# Patient Record
Sex: FEMALE | Race: OTHER | NOT HISPANIC OR LATINO
[De-identification: names, ages, dates, MRNs, and addresses within clinical notes are randomized per-mention and may not be internally consistent; named-entity substitution may affect disease eponyms.]

---

## 2017-01-03 ENCOUNTER — APPOINTMENT (OUTPATIENT)
Dept: NEUROSURGERY | Facility: CLINIC | Age: 72
End: 2017-01-03

## 2017-01-03 VITALS
HEART RATE: 87 BPM | HEIGHT: 56 IN | WEIGHT: 94 LBS | BODY MASS INDEX: 21.15 KG/M2 | DIASTOLIC BLOOD PRESSURE: 78 MMHG | SYSTOLIC BLOOD PRESSURE: 111 MMHG

## 2017-02-09 ENCOUNTER — APPOINTMENT (OUTPATIENT)
Dept: NEUROSURGERY | Facility: CLINIC | Age: 72
End: 2017-02-09

## 2017-03-02 ENCOUNTER — OUTPATIENT (OUTPATIENT)
Dept: OUTPATIENT SERVICES | Facility: HOSPITAL | Age: 72
LOS: 1 days | End: 2017-03-02
Payer: COMMERCIAL

## 2017-03-02 ENCOUNTER — TRANSCRIPTION ENCOUNTER (OUTPATIENT)
Age: 72
End: 2017-03-02

## 2017-03-02 ENCOUNTER — APPOINTMENT (OUTPATIENT)
Dept: NEUROSURGERY | Facility: CLINIC | Age: 72
End: 2017-03-02

## 2017-03-02 DIAGNOSIS — M54.16 RADICULOPATHY, LUMBAR REGION: ICD-10-CM

## 2017-03-02 PROCEDURE — 62323 NJX INTERLAMINAR LMBR/SAC: CPT

## 2017-03-24 ENCOUNTER — APPOINTMENT (OUTPATIENT)
Dept: NEUROSURGERY | Facility: CLINIC | Age: 72
End: 2017-03-24

## 2017-03-24 VITALS
HEART RATE: 76 BPM | SYSTOLIC BLOOD PRESSURE: 110 MMHG | DIASTOLIC BLOOD PRESSURE: 76 MMHG | WEIGHT: 95 LBS | BODY MASS INDEX: 21.37 KG/M2 | HEIGHT: 56 IN

## 2017-03-24 DIAGNOSIS — M48.06 SPINAL STENOSIS, LUMBAR REGION: ICD-10-CM

## 2017-05-04 ENCOUNTER — APPOINTMENT (OUTPATIENT)
Dept: NEUROLOGY | Facility: CLINIC | Age: 72
End: 2017-05-04

## 2017-05-04 VITALS
DIASTOLIC BLOOD PRESSURE: 85 MMHG | HEART RATE: 81 BPM | BODY MASS INDEX: 21.15 KG/M2 | HEIGHT: 56 IN | SYSTOLIC BLOOD PRESSURE: 151 MMHG | WEIGHT: 94 LBS

## 2017-05-04 VITALS — DIASTOLIC BLOOD PRESSURE: 86 MMHG | SYSTOLIC BLOOD PRESSURE: 136 MMHG | HEART RATE: 78 BPM

## 2017-05-04 DIAGNOSIS — Z82.49 FAMILY HISTORY OF ISCHEMIC HEART DISEASE AND OTHER DISEASES OF THE CIRCULATORY SYSTEM: ICD-10-CM

## 2017-05-04 DIAGNOSIS — R53.1 WEAKNESS: ICD-10-CM

## 2017-05-04 DIAGNOSIS — M54.5 LOW BACK PAIN: ICD-10-CM

## 2017-05-04 DIAGNOSIS — Z81.8 FAMILY HISTORY OF OTHER MENTAL AND BEHAVIORAL DISORDERS: ICD-10-CM

## 2017-05-04 DIAGNOSIS — G89.29 LOW BACK PAIN: ICD-10-CM

## 2017-05-04 DIAGNOSIS — Z80.49 FAMILY HISTORY OF MALIGNANT NEOPLASM OF OTHER GENITAL ORGANS: ICD-10-CM

## 2017-05-14 ENCOUNTER — APPOINTMENT (OUTPATIENT)
Dept: MRI IMAGING | Facility: CLINIC | Age: 72
End: 2017-05-14

## 2017-05-14 ENCOUNTER — OUTPATIENT (OUTPATIENT)
Dept: OUTPATIENT SERVICES | Facility: HOSPITAL | Age: 72
LOS: 1 days | End: 2017-05-14
Payer: COMMERCIAL

## 2017-05-14 DIAGNOSIS — M62.81 MUSCLE WEAKNESS (GENERALIZED): ICD-10-CM

## 2017-05-14 DIAGNOSIS — R25.1 TREMOR, UNSPECIFIED: ICD-10-CM

## 2017-05-14 PROCEDURE — 70551 MRI BRAIN STEM W/O DYE: CPT

## 2017-05-24 ENCOUNTER — MESSAGE (OUTPATIENT)
Age: 72
End: 2017-05-24

## 2017-06-01 ENCOUNTER — APPOINTMENT (OUTPATIENT)
Dept: NEUROLOGY | Facility: CLINIC | Age: 72
End: 2017-06-01

## 2017-06-01 VITALS
BODY MASS INDEX: 21.15 KG/M2 | DIASTOLIC BLOOD PRESSURE: 90 MMHG | SYSTOLIC BLOOD PRESSURE: 138 MMHG | HEIGHT: 56 IN | WEIGHT: 94 LBS | HEART RATE: 90 BPM

## 2017-06-01 DIAGNOSIS — M54.16 RADICULOPATHY, LUMBAR REGION: ICD-10-CM

## 2017-06-01 DIAGNOSIS — R42 DIZZINESS AND GIDDINESS: ICD-10-CM

## 2017-06-01 DIAGNOSIS — R25.1 TREMOR, UNSPECIFIED: ICD-10-CM

## 2017-07-24 ENCOUNTER — APPOINTMENT (OUTPATIENT)
Dept: NEUROLOGY | Facility: CLINIC | Age: 72
End: 2017-07-24

## 2017-08-07 ENCOUNTER — RX RENEWAL (OUTPATIENT)
Age: 72
End: 2017-08-07

## 2017-09-28 ENCOUNTER — APPOINTMENT (OUTPATIENT)
Dept: NEUROLOGY | Facility: CLINIC | Age: 72
End: 2017-09-28
Payer: MEDICARE

## 2017-09-28 PROCEDURE — 95911 NRV CNDJ TEST 9-10 STUDIES: CPT

## 2017-09-28 PROCEDURE — 95886 MUSC TEST DONE W/N TEST COMP: CPT

## 2017-11-07 ENCOUNTER — RX RENEWAL (OUTPATIENT)
Age: 72
End: 2017-11-07

## 2018-02-05 ENCOUNTER — RX RENEWAL (OUTPATIENT)
Age: 73
End: 2018-02-05

## 2018-04-04 ENCOUNTER — APPOINTMENT (OUTPATIENT)
Dept: GASTROENTEROLOGY | Facility: CLINIC | Age: 73
End: 2018-04-04

## 2018-07-17 ENCOUNTER — APPOINTMENT (OUTPATIENT)
Dept: NEUROLOGY | Facility: CLINIC | Age: 73
End: 2018-07-17

## 2021-01-24 ENCOUNTER — INPATIENT (INPATIENT)
Facility: HOSPITAL | Age: 76
LOS: 2 days | Discharge: NURSING, REHAB OR LONG-TERM CARE | End: 2021-01-27
Attending: HOSPITALIST | Admitting: HOSPITALIST
Payer: MEDICARE

## 2021-01-24 VITALS
HEART RATE: 72 BPM | OXYGEN SATURATION: 100 % | TEMPERATURE: 98 F | SYSTOLIC BLOOD PRESSURE: 150 MMHG | DIASTOLIC BLOOD PRESSURE: 60 MMHG | RESPIRATION RATE: 18 BRPM

## 2021-01-24 LAB
ALBUMIN SERPL ELPH-MCNC: 3.5 G/DL — SIGNIFICANT CHANGE UP (ref 3.3–5)
ALP SERPL-CCNC: 83 U/L — SIGNIFICANT CHANGE UP (ref 40–120)
ALT FLD-CCNC: 6 U/L — SIGNIFICANT CHANGE UP (ref 4–33)
ANION GAP SERPL CALC-SCNC: 10 MMOL/L — SIGNIFICANT CHANGE UP (ref 7–14)
APPEARANCE UR: CLEAR — SIGNIFICANT CHANGE UP
AST SERPL-CCNC: 26 U/L — SIGNIFICANT CHANGE UP (ref 4–32)
BACTERIA # UR AUTO: NEGATIVE — SIGNIFICANT CHANGE UP
BASOPHILS # BLD AUTO: 0 K/UL — SIGNIFICANT CHANGE UP (ref 0–0.2)
BASOPHILS NFR BLD AUTO: 0 % — SIGNIFICANT CHANGE UP (ref 0–2)
BILIRUB SERPL-MCNC: 0.4 MG/DL — SIGNIFICANT CHANGE UP (ref 0.2–1.2)
BILIRUB UR-MCNC: NEGATIVE — SIGNIFICANT CHANGE UP
BLOOD GAS VENOUS COMPREHENSIVE RESULT: SIGNIFICANT CHANGE UP
BUN SERPL-MCNC: 21 MG/DL — SIGNIFICANT CHANGE UP (ref 7–23)
CALCIUM SERPL-MCNC: 9 MG/DL — SIGNIFICANT CHANGE UP (ref 8.4–10.5)
CHLORIDE SERPL-SCNC: 102 MMOL/L — SIGNIFICANT CHANGE UP (ref 98–107)
CO2 SERPL-SCNC: 26 MMOL/L — SIGNIFICANT CHANGE UP (ref 22–31)
COD CRY URNS QL: ABNORMAL
COLOR SPEC: YELLOW — SIGNIFICANT CHANGE UP
CREAT SERPL-MCNC: 0.54 MG/DL — SIGNIFICANT CHANGE UP (ref 0.5–1.3)
DIFF PNL FLD: NEGATIVE — SIGNIFICANT CHANGE UP
EOSINOPHIL # BLD AUTO: 0.05 K/UL — SIGNIFICANT CHANGE UP (ref 0–0.5)
EOSINOPHIL NFR BLD AUTO: 0.9 % — SIGNIFICANT CHANGE UP (ref 0–6)
EPI CELLS # UR: 1 /HPF — SIGNIFICANT CHANGE UP (ref 0–5)
GLUCOSE SERPL-MCNC: 105 MG/DL — HIGH (ref 70–99)
GLUCOSE UR QL: NEGATIVE — SIGNIFICANT CHANGE UP
HCT VFR BLD CALC: 36 % — SIGNIFICANT CHANGE UP (ref 34.5–45)
HGB BLD-MCNC: 11.2 G/DL — LOW (ref 11.5–15.5)
HYALINE CASTS # UR AUTO: 3 /LPF — SIGNIFICANT CHANGE UP (ref 0–7)
IANC: 4.08 K/UL — SIGNIFICANT CHANGE UP (ref 1.5–8.5)
KETONES UR-MCNC: ABNORMAL
LEUKOCYTE ESTERASE UR-ACNC: NEGATIVE — SIGNIFICANT CHANGE UP
LYMPHOCYTES # BLD AUTO: 0.15 K/UL — LOW (ref 1–3.3)
LYMPHOCYTES # BLD AUTO: 2.8 % — LOW (ref 13–44)
MCHC RBC-ENTMCNC: 27 PG — SIGNIFICANT CHANGE UP (ref 27–34)
MCHC RBC-ENTMCNC: 31.1 GM/DL — LOW (ref 32–36)
MCV RBC AUTO: 86.7 FL — SIGNIFICANT CHANGE UP (ref 80–100)
MONOCYTES # BLD AUTO: 0.3 K/UL — SIGNIFICANT CHANGE UP (ref 0–0.9)
MONOCYTES NFR BLD AUTO: 5.5 % — SIGNIFICANT CHANGE UP (ref 2–14)
NEUTROPHILS # BLD AUTO: 4.16 K/UL — SIGNIFICANT CHANGE UP (ref 1.8–7.4)
NEUTROPHILS NFR BLD AUTO: 75.2 % — SIGNIFICANT CHANGE UP (ref 43–77)
NITRITE UR-MCNC: NEGATIVE — SIGNIFICANT CHANGE UP
PH UR: 6 — SIGNIFICANT CHANGE UP (ref 5–8)
PLATELET # BLD AUTO: 302 K/UL — SIGNIFICANT CHANGE UP (ref 150–400)
POTASSIUM SERPL-MCNC: 3.7 MMOL/L — SIGNIFICANT CHANGE UP (ref 3.5–5.3)
POTASSIUM SERPL-SCNC: 3.7 MMOL/L — SIGNIFICANT CHANGE UP (ref 3.5–5.3)
PROT SERPL-MCNC: 6.4 G/DL — SIGNIFICANT CHANGE UP (ref 6–8.3)
PROT UR-MCNC: ABNORMAL
RBC # BLD: 4.15 M/UL — SIGNIFICANT CHANGE UP (ref 3.8–5.2)
RBC # FLD: 17.7 % — HIGH (ref 10.3–14.5)
RBC CASTS # UR COMP ASSIST: 1 /HPF — SIGNIFICANT CHANGE UP (ref 0–4)
SODIUM SERPL-SCNC: 138 MMOL/L — SIGNIFICANT CHANGE UP (ref 135–145)
SP GR SPEC: 1.03 — HIGH (ref 1.01–1.02)
UROBILINOGEN FLD QL: SIGNIFICANT CHANGE UP
WBC # BLD: 5.4 K/UL — SIGNIFICANT CHANGE UP (ref 3.8–10.5)
WBC # FLD AUTO: 5.4 K/UL — SIGNIFICANT CHANGE UP (ref 3.8–10.5)
WBC UR QL: 1 /HPF — SIGNIFICANT CHANGE UP (ref 0–5)

## 2021-01-24 PROCEDURE — 71046 X-RAY EXAM CHEST 2 VIEWS: CPT | Mod: 26

## 2021-01-24 PROCEDURE — 99285 EMERGENCY DEPT VISIT HI MDM: CPT

## 2021-01-24 RX ORDER — CEFEPIME 1 G/1
1000 INJECTION, POWDER, FOR SOLUTION INTRAMUSCULAR; INTRAVENOUS ONCE
Refills: 0 | Status: COMPLETED | OUTPATIENT
Start: 2021-01-24 | End: 2021-01-24

## 2021-01-24 RX ORDER — VANCOMYCIN HCL 1 G
1000 VIAL (EA) INTRAVENOUS ONCE
Refills: 0 | Status: DISCONTINUED | OUTPATIENT
Start: 2021-01-24 | End: 2021-01-25

## 2021-01-24 NOTE — ED PROVIDER NOTE - CLINICAL SUMMARY MEDICAL DECISION MAKING FREE TEXT BOX
Impression:  Elderly F, with mental status off baseline, and level of energy off baseline.  Physical exam is unremarkable, but unreliable in this age group.  Plan:  labs, UA/UCx, CXR, ECG, CT head, hydrate, reassess.  COVID swab.

## 2021-01-24 NOTE — ED ADULT NURSE NOTE - OBJECTIVE STATEMENT
Li RN: Pt received to room 12 from [facility] due to AMS. AxOx4 at this time, ambulatory with cane at baseline as per pt. When pt is asked why she is here, pt states "I'm here for a blood test. I made an appointment." Pt unable to specify why she is here. Pt states "I live at home with my ." Pt appears thin, comfortable, in NAD, respirations even/unlabored, NSR on monitor. Pt c/o mild back pain. Pt denies headache, dizziness, chest pain, SOB, nausea, vomiting, diarrhea, fever, chills and cough. Pt unable to provide med hx. Stage 1 pressure ulcer noted to sacral area. 18G IV placed to L AC, labs drawn and sent, urine collected, covid swabbed, report endorsed to primary RN Wendy

## 2021-01-24 NOTE — ED PROVIDER NOTE - ATTENDING CONTRIBUTION TO CARE
MD Urbano:  patient seen and evaluated with the resident.  I was present for key portions of the History & Physical, and I agree with the Impression & Plan.  MD Urbano:  74 yo F, BIB EMS from [facility] at behest of family for r/o UTI/urospsis.    Duration: worsening over the last week.    Associated Sx:  unk - patient has dementia.    Better/worse: unk - patient has dementia.    VS: wnl.  Physical Exam: elderly F, chronically ill-appearing, R chest wall PPM/AICD? scar, +sternotomy scar, cachectic.  NCAT, PERRL, EOMI, neck supple, RRR, CTA B,   Abd: s/nd/nt, Ext: no edema, Neuro: AAOx1 (baseline = AAOx1), gait not assessed, strength 5/5 & symmetric throughout.  Impression:  Elderly F, with mental status off baseline, and level of energy off baseline.  Physical exam is unremarkable, but unreliable in this age group.  Plan:  labs, UA/UCx, CXR, ECG, CT head, hydrate, reassess.  COVID swab.

## 2021-01-24 NOTE — ED PROVIDER NOTE - OBJECTIVE STATEMENT
76 y/o F hx of dementia, aortic valve repair on plavix presents from nursing home due to dysuria as per daughter. all information gained from daughter over the phone who states patient was confused today. was stating that she does not like where she is and mentioned burning with urination. daughter wanted pt to be evaluated in ED for uti. pt aao x 2  pt denies any fever, chills, dizziness, cp, palpitations, cough, sob, wheezing, abdominal pain, n/v/d, dysuria, hematuria, or any weight loss.

## 2021-01-24 NOTE — ED ADULT NURSE NOTE - CHIEF COMPLAINT QUOTE
pt from [facility] family requested  pt to come to ed for increased confusion r/o urosepsis. alert,oriented x2. as per nursing home staff " normal mental status". pt unable to verbalize problems urinating. calm,cooperative at triage. fs by ems 140

## 2021-01-24 NOTE — ED PROVIDER NOTE - UNABLE TO OBTAIN
Dementia aao x 2 all information from daughter over phoen aao x 2 aao x 2 all information from daughter over phone

## 2021-01-24 NOTE — ED ADULT TRIAGE NOTE - CHIEF COMPLAINT QUOTE
pt from [facility] family requested  pt to come to ed for increased confusion r/o urosepsis. alert,oriented x2. as per nursing home staff " normal mental status". pt unable to verbalize problems urinating. calm,cooperative at triage. fs by ems 140

## 2021-01-25 ENCOUNTER — TRANSCRIPTION ENCOUNTER (OUTPATIENT)
Age: 76
End: 2021-01-25

## 2021-01-25 DIAGNOSIS — J18.9 PNEUMONIA, UNSPECIFIED ORGANISM: ICD-10-CM

## 2021-01-25 DIAGNOSIS — G20 PARKINSON'S DISEASE: ICD-10-CM

## 2021-01-25 DIAGNOSIS — G93.41 METABOLIC ENCEPHALOPATHY: ICD-10-CM

## 2021-01-25 LAB
CULTURE RESULTS: SIGNIFICANT CHANGE UP
SARS-COV-2 RNA SPEC QL NAA+PROBE: SIGNIFICANT CHANGE UP
SPECIMEN SOURCE: SIGNIFICANT CHANGE UP

## 2021-01-25 PROCEDURE — 12345: CPT | Mod: NC

## 2021-01-25 PROCEDURE — 99223 1ST HOSP IP/OBS HIGH 75: CPT

## 2021-01-25 PROCEDURE — 70450 CT HEAD/BRAIN W/O DYE: CPT | Mod: 26

## 2021-01-25 PROCEDURE — 71250 CT THORAX DX C-: CPT | Mod: 26

## 2021-01-25 RX ORDER — ENOXAPARIN SODIUM 100 MG/ML
30 INJECTION SUBCUTANEOUS DAILY
Refills: 0 | Status: DISCONTINUED | OUTPATIENT
Start: 2021-01-25 | End: 2021-01-27

## 2021-01-25 RX ORDER — VANCOMYCIN HCL 1 G
750 VIAL (EA) INTRAVENOUS EVERY 12 HOURS
Refills: 0 | Status: DISCONTINUED | OUTPATIENT
Start: 2021-01-25 | End: 2021-01-25

## 2021-01-25 RX ORDER — DRONABINOL 2.5 MG
2.5 CAPSULE ORAL
Refills: 0 | Status: DISCONTINUED | OUTPATIENT
Start: 2021-01-25 | End: 2021-01-27

## 2021-01-25 RX ORDER — ZINC OXIDE 200 MG/G
1 OINTMENT TOPICAL
Refills: 0 | Status: DISCONTINUED | OUTPATIENT
Start: 2021-01-25 | End: 2021-01-27

## 2021-01-25 RX ORDER — CEFEPIME 1 G/1
1000 INJECTION, POWDER, FOR SOLUTION INTRAMUSCULAR; INTRAVENOUS EVERY 8 HOURS
Refills: 0 | Status: DISCONTINUED | OUTPATIENT
Start: 2021-01-25 | End: 2021-01-26

## 2021-01-25 RX ORDER — VANCOMYCIN HCL 1 G
750 VIAL (EA) INTRAVENOUS EVERY 24 HOURS
Refills: 0 | Status: DISCONTINUED | OUTPATIENT
Start: 2021-01-25 | End: 2021-01-26

## 2021-01-25 RX ORDER — CARBIDOPA AND LEVODOPA 25; 100 MG/1; MG/1
1 TABLET ORAL
Refills: 0 | Status: DISCONTINUED | OUTPATIENT
Start: 2021-01-25 | End: 2021-01-27

## 2021-01-25 RX ORDER — ASPIRIN/CALCIUM CARB/MAGNESIUM 324 MG
81 TABLET ORAL DAILY
Refills: 0 | Status: DISCONTINUED | OUTPATIENT
Start: 2021-01-25 | End: 2021-01-27

## 2021-01-25 RX ORDER — CLOPIDOGREL BISULFATE 75 MG/1
75 TABLET, FILM COATED ORAL DAILY
Refills: 0 | Status: DISCONTINUED | OUTPATIENT
Start: 2021-01-25 | End: 2021-01-27

## 2021-01-25 RX ORDER — PANTOPRAZOLE SODIUM 20 MG/1
40 TABLET, DELAYED RELEASE ORAL
Refills: 0 | Status: DISCONTINUED | OUTPATIENT
Start: 2021-01-25 | End: 2021-01-27

## 2021-01-25 RX ORDER — SENNA PLUS 8.6 MG/1
2 TABLET ORAL AT BEDTIME
Refills: 0 | Status: DISCONTINUED | OUTPATIENT
Start: 2021-01-25 | End: 2021-01-27

## 2021-01-25 RX ORDER — ZINC SULFATE TAB 220 MG (50 MG ZINC EQUIVALENT) 220 (50 ZN) MG
220 TAB ORAL DAILY
Refills: 0 | Status: DISCONTINUED | OUTPATIENT
Start: 2021-01-25 | End: 2021-01-27

## 2021-01-25 RX ORDER — ASCORBIC ACID 60 MG
500 TABLET,CHEWABLE ORAL DAILY
Refills: 0 | Status: DISCONTINUED | OUTPATIENT
Start: 2021-01-25 | End: 2021-01-27

## 2021-01-25 RX ADMIN — Medication 1 TABLET(S): at 11:36

## 2021-01-25 RX ADMIN — SENNA PLUS 2 TABLET(S): 8.6 TABLET ORAL at 20:29

## 2021-01-25 RX ADMIN — CEFEPIME 100 MILLIGRAM(S): 1 INJECTION, POWDER, FOR SOLUTION INTRAMUSCULAR; INTRAVENOUS at 16:48

## 2021-01-25 RX ADMIN — Medication 500 MILLIGRAM(S): at 11:36

## 2021-01-25 RX ADMIN — CARBIDOPA AND LEVODOPA 1 TABLET(S): 25; 100 TABLET ORAL at 13:11

## 2021-01-25 RX ADMIN — CLOPIDOGREL BISULFATE 75 MILLIGRAM(S): 75 TABLET, FILM COATED ORAL at 11:36

## 2021-01-25 RX ADMIN — CARBIDOPA AND LEVODOPA 1 TABLET(S): 25; 100 TABLET ORAL at 16:48

## 2021-01-25 RX ADMIN — CEFEPIME 100 MILLIGRAM(S): 1 INJECTION, POWDER, FOR SOLUTION INTRAMUSCULAR; INTRAVENOUS at 00:13

## 2021-01-25 RX ADMIN — Medication 2.5 MILLIGRAM(S): at 16:48

## 2021-01-25 RX ADMIN — ZINC SULFATE TAB 220 MG (50 MG ZINC EQUIVALENT) 220 MILLIGRAM(S): 220 (50 ZN) TAB at 13:11

## 2021-01-25 RX ADMIN — Medication 2.5 MILLIGRAM(S): at 05:55

## 2021-01-25 RX ADMIN — Medication 250 MILLIGRAM(S): at 05:55

## 2021-01-25 RX ADMIN — ENOXAPARIN SODIUM 30 MILLIGRAM(S): 100 INJECTION SUBCUTANEOUS at 13:11

## 2021-01-25 RX ADMIN — CEFEPIME 100 MILLIGRAM(S): 1 INJECTION, POWDER, FOR SOLUTION INTRAMUSCULAR; INTRAVENOUS at 23:45

## 2021-01-25 RX ADMIN — CARBIDOPA AND LEVODOPA 1 TABLET(S): 25; 100 TABLET ORAL at 09:00

## 2021-01-25 RX ADMIN — CEFEPIME 100 MILLIGRAM(S): 1 INJECTION, POWDER, FOR SOLUTION INTRAMUSCULAR; INTRAVENOUS at 09:00

## 2021-01-25 RX ADMIN — PANTOPRAZOLE SODIUM 40 MILLIGRAM(S): 20 TABLET, DELAYED RELEASE ORAL at 05:55

## 2021-01-25 RX ADMIN — Medication 81 MILLIGRAM(S): at 11:36

## 2021-01-25 RX ADMIN — ZINC OXIDE 1 APPLICATION(S): 200 OINTMENT TOPICAL at 16:48

## 2021-01-25 NOTE — DISCHARGE NOTE PROVIDER - HOSPITAL COURSE
75 F PMH Parkinson's Disease w/ dementia (AOX2), AVR on Plavix, CHF, PVD, Afib not on A/C p/w metabolic encephalopathy, HCAP  CXR Right midlung peripheral opacity may represent covid 19 pneumonia. Covid swab was negative. Pt has been treated w/ vanc/cefepime. CT chest showed: Patchy opacities in the right upper lobe demonstrate increased attenuation. Diffuse increased attenuation of the liver is also noted  -CT head-Mild chronic white matter microvascular type changes.  Parkinson disease, c/w sinemet       75 F PMH Parkinson's Disease, dementia (AOX2), AVR on Plavix, CHF, PVD, Afib (not on A/C) p/w metabolic encephalopathy, HCAP. CT head with mild chronic white matter microvascular type changes. CXR RML peripheral opacity may represent COVID19 pneumonia. COVID swab was negative. Pt has been treated with Vanco/Cefepime. CT chest showed patchy opacities in the right upper lobe demonstrate increased attenuation. Diffuse increased attenuation of the liver is also noted. ID consulted, recommend no Abx.     Pt monitored and remained hemodynamically stable.    Spoke with attending, Dr. Zamora, pt is medically stable for discharge to rehab.

## 2021-01-25 NOTE — H&P ADULT - NSHPLABSRESULTS_GEN_ALL_CORE
138  |  102  |  21  ----------------------------<  105<H>  3.7   |  26  |  0.54    Ca    9.0      2021 21:41    TPro  6.4  /  Alb  3.5  /  TBili  0.4  /  DBili  x   /  AST  26  /  ALT  6   /  AlkPhos  83                              11.2   5.40  )-----------( 302      ( 2021 21:40 )             36.0             LIVER FUNCTIONS - ( 2021 21:41 )  Alb: 3.5 g/dL / Pro: 6.4 g/dL / ALK PHOS: 83 U/L / ALT: 6 U/L / AST: 26 U/L / GGT: x                 Urinalysis Basic - ( 2021 21:40 )    Color: Yellow / Appearance: Clear / S.033 / pH: x  Gluc: x / Ketone: Trace  / Bili: Negative / Urobili: <2 mg/dL   Blood: x / Protein: 30 mg/dL / Nitrite: Negative   Leuk Esterase: Negative / RBC: 1 /HPF / WBC 1 /HPF   Sq Epi: x / Non Sq Epi: 1 /HPF / Bacteria: Negative

## 2021-01-25 NOTE — PROGRESS NOTE ADULT - PROBLEM SELECTOR PLAN 1
seen on CXR, will check CT chest urgent, c/w vanc/cefepime for now, f/u COVID testing (negative on 1/22 in NH) seen on CXR, CT chest noted :Patchy opacities in the right upper lobe  as above , c/w vanc/cefepime for now,    COVID testing  negative again on 1/24 (negative on 1/22 in NH)  f/u blood and urine cx

## 2021-01-25 NOTE — DISCHARGE NOTE PROVIDER - NSDCMRMEDTOKEN_GEN_ALL_CORE_FT
acetaminophen 650 mg oral tablet: 1 tab(s) orally every 4 hours, As Needed  aspirin 81 mg oral tablet, chewable: 1 tab(s) orally once a day  carbidopa-levodopa 25 mg-100 mg oral tablet: 1 tab(s) orally 3 times a day. 9 AM, 1 PM, 5 PM  dronabinol 2.5 mg oral capsule: 1 cap(s) orally 2 times a day  MiraLax oral powder for reconstitution: 17 gram(s) orally once a day  Multiple Vitamins oral tablet: 1 tab(s) orally once a day  Plavix 75 mg oral tablet: 1 tab(s) orally once a day  Protonix 40 mg oral delayed release tablet: 1 tab(s) orally once a day  Senna 8.6 mg oral tablet: 1 tab(s) orally once a day (at bedtime)  Vitamin C 500 mg oral tablet: 1 tab(s) orally once a day  zinc sulfate 220 mg oral tablet: 1 tab(s) orally once a day   acetaminophen 650 mg oral tablet: 1 tab(s) orally every 4 hours, As Needed  aspirin 81 mg oral tablet, chewable: 1 tab(s) orally once a day  carbidopa-levodopa 25 mg-100 mg oral tablet: 1 tab(s) orally 3 times a day. 9 AM, 1 PM, 5 PM  dronabinol 2.5 mg oral capsule: 1 cap(s) orally 2 times a day  MiraLax oral powder for reconstitution: 17 gram(s) orally once a day  Multiple Vitamins oral tablet: 1 tab(s) orally once a day  Plavix 75 mg oral tablet: 1 tab(s) orally once a day  Protonix 40 mg oral delayed release tablet: 1 tab(s) orally once a day  Senna 8.6 mg oral tablet: 1 tab(s) orally once a day (at bedtime)  Vitamin C 500 mg oral tablet: 1 tab(s) orally once a day  zinc oxide 20% topical ointment: 1 application topically 2 times a day  zinc sulfate 220 mg oral tablet: 1 tab(s) orally once a day   acetaminophen 650 mg oral tablet: 1 tab(s) orally every 4 hours, As Needed  aspirin 81 mg oral tablet, chewable: 1 tab(s) orally once a day  carbidopa-levodopa 25 mg-100 mg oral tablet: 1 tab(s) orally 3 times a day. 9 AM, 1 PM, 5 PM  dronabinol 2.5 mg oral capsule: 1 cap(s) orally 2 times a day  Multiple Vitamins oral tablet: 1 tab(s) orally once a day  Plavix 75 mg oral tablet: 1 tab(s) orally once a day  Protonix 40 mg oral delayed release tablet: 1 tab(s) orally once a day  Senna 8.6 mg oral tablet: 1 tab(s) orally once a day (at bedtime), As Needed Constipation  Vitamin C 500 mg oral tablet: 1 tab(s) orally once a day  zinc oxide 20% topical ointment: 1 application topically 2 times a day  zinc sulfate 220 mg oral tablet: 1 tab(s) orally once a day

## 2021-01-25 NOTE — DIETITIAN INITIAL EVALUATION ADULT. - ADD RECOMMEND
1). Encourage and monitor oral intake, especially of nutrition supplement. 2). Follow pt as per protocol. 3). Monitor weights, labs, BM's, skin integrity, p.o. intake and edema.

## 2021-01-25 NOTE — H&P ADULT - ASSESSMENT
75 F PMH Parkinson's Disease w/ dementia (AOX2), AVR on Plavix, CHF, PVD, Afib not on A/C p/w metabolic encephalopathy, HCAP

## 2021-01-25 NOTE — ED ADULT NURSE REASSESSMENT NOTE - NS ED NURSE REASSESS COMMENT FT1
Break shift RN: py brought to floor at this time. Resps are even and unlabored. Spo2 100% On Ra. Pt in NAD.

## 2021-01-25 NOTE — DIETITIAN INITIAL EVALUATION ADULT. - PERTINENT MEDS FT
MEDICATIONS  (STANDING):  ascorbic acid 500 milliGRAM(s) Oral daily  aspirin enteric coated 81 milliGRAM(s) Oral daily  carbidopa/levodopa  25/100 1 Tablet(s) Oral <User Schedule>  cefepime   IVPB 1000 milliGRAM(s) IV Intermittent every 8 hours  clopidogrel Tablet 75 milliGRAM(s) Oral daily  dronabinol 2.5 milliGRAM(s) Oral two times a day  enoxaparin Injectable 30 milliGRAM(s) SubCutaneous daily  multivitamin 1 Tablet(s) Oral daily  pantoprazole    Tablet 40 milliGRAM(s) Oral before breakfast  senna 2 Tablet(s) Oral at bedtime  vancomycin  IVPB 750 milliGRAM(s) IV Intermittent every 24 hours  zinc sulfate 220 milliGRAM(s) Oral daily

## 2021-01-25 NOTE — H&P ADULT - NSHPPHYSICALEXAM_GEN_ALL_CORE
T(C): 36.9 (01-25-21 @ 01:16), Max: 36.9 (01-25-21 @ 01:16)  HR: 67 (01-25-21 @ 01:16) (67 - 75)  BP: 146/60 (01-25-21 @ 01:16) (146/60 - 160/49)  RR: 18 (01-25-21 @ 01:16) (18 - 18)  SpO2: 100% (01-25-21 @ 01:16) (100% - 100%)    Constitutional: NAD, thin  Ears, Nose, Mouth, and Throat: normal external ears and nose, normal hearing, moist oral mucosa  Eyes: normal conjunctiva, EOMI, PERRL  Neck: supple, no JVD  Respiratory: Clear to auscultation bilaterally. No wheezes, rales or rhonchi. Normal respiratory effort  Cardiovascular: RRR, +SM, no edema, 2+ Peripheral Pulses  Gastrointestinal: soft, nontender, nondistended, +BS, no hernia  Skin: warm, dry, no rash  Neurologic: sensation grossly intact, CN grossly intact, non-focal exam  Musculoskeletal: no clubbing, no cyanosis, no joint swelling  Psychiatric: AOX3, appropriate mood, affect

## 2021-01-25 NOTE — PROGRESS NOTE ADULT - SUBJECTIVE AND OBJECTIVE BOX
Patient is a 75y old  Female who presents with a chief complaint of AMS (2021 00:56)      SUBJECTIVE / OVERNIGHT EVENTS:    MEDICATIONS  (STANDING):  ascorbic acid 500 milliGRAM(s) Oral daily  aspirin enteric coated 81 milliGRAM(s) Oral daily  carbidopa/levodopa  25/100 1 Tablet(s) Oral <User Schedule>  cefepime   IVPB 1000 milliGRAM(s) IV Intermittent every 8 hours  clopidogrel Tablet 75 milliGRAM(s) Oral daily  dronabinol 2.5 milliGRAM(s) Oral two times a day  enoxaparin Injectable 30 milliGRAM(s) SubCutaneous daily  multivitamin 1 Tablet(s) Oral daily  pantoprazole    Tablet 40 milliGRAM(s) Oral before breakfast  senna 2 Tablet(s) Oral at bedtime  vancomycin  IVPB 750 milliGRAM(s) IV Intermittent every 24 hours  zinc sulfate 220 milliGRAM(s) Oral daily    MEDICATIONS  (PRN):      Vital Signs Last 24 Hrs  T(C): 37.1 (2021 03:59), Max: 37.1 (2021 03:59)  T(F): 98.7 (2021 03:59), Max: 98.7 (2021 03:59)  HR: 63 (2021 03:59) (63 - 75)  BP: 140/59 (2021 03:59) (140/59 - 160/49)  BP(mean): --  RR: 17 (2021 03:59) (17 - 18)  SpO2: 100% (2021 03:59) (100% - 100%)  CAPILLARY BLOOD GLUCOSE        I&O's Summary      PHYSICAL EXAM:  GENERAL: NAD, well-developed  HEAD:  Atraumatic, Normocephalic  EYES: EOMI, PERRLA, conjunctiva and sclera clear  NECK: Supple, No JVD  CHEST/LUNG: Clear to auscultation bilaterally; No wheeze  HEART: Regular rate and rhythm; No murmurs, rubs, or gallops  ABDOMEN: Soft, Nontender, Nondistended; Bowel sounds present  EXTREMITIES:  2+ Peripheral Pulses, No clubbing, cyanosis, or edema  PSYCH: AAOx3  NEUROLOGY: non-focal  SKIN: No rashes or lesions    LABS:                        11.2   5.40  )-----------( 302      ( 2021 21:40 )             36.0         138  |  102  |  21  ----------------------------<  105<H>  3.7   |  26  |  0.54    Ca    9.0      2021 21:41    TPro  6.4  /  Alb  3.5  /  TBili  0.4  /  DBili  x   /  AST  26  /  ALT  6   /  AlkPhos  83            Urinalysis Basic - ( 2021 21:40 )    Color: Yellow / Appearance: Clear / S.033 / pH: x  Gluc: x / Ketone: Trace  / Bili: Negative / Urobili: <2 mg/dL   Blood: x / Protein: 30 mg/dL / Nitrite: Negative   Leuk Esterase: Negative / RBC: 1 /HPF / WBC 1 /HPF   Sq Epi: x / Non Sq Epi: 1 /HPF / Bacteria: Negative        RADIOLOGY & ADDITIONAL TESTS:    Imaging Personally Reviewed:    Consultant(s) Notes Reviewed:      Care Discussed with Consultants/Other Providers:   Patient is a 75y old  Female who presents with a chief complaint of AMS (2021 00:56)      SUBJECTIVE / OVERNIGHT EVENTS: patient seen and examined by bedside, denies headache, dizziness, SOB, CP, Palpitations , N/V/D, abdominal pain  pt reports frequent urination asking for diaper       MEDICATIONS  (STANDING):  ascorbic acid 500 milliGRAM(s) Oral daily  aspirin enteric coated 81 milliGRAM(s) Oral daily  carbidopa/levodopa  25/100 1 Tablet(s) Oral <User Schedule>  cefepime   IVPB 1000 milliGRAM(s) IV Intermittent every 8 hours  clopidogrel Tablet 75 milliGRAM(s) Oral daily  dronabinol 2.5 milliGRAM(s) Oral two times a day  enoxaparin Injectable 30 milliGRAM(s) SubCutaneous daily  multivitamin 1 Tablet(s) Oral daily  pantoprazole    Tablet 40 milliGRAM(s) Oral before breakfast  senna 2 Tablet(s) Oral at bedtime  vancomycin  IVPB 750 milliGRAM(s) IV Intermittent every 24 hours  zinc sulfate 220 milliGRAM(s) Oral daily    MEDICATIONS  (PRN):      Vital Signs Last 24 Hrs  T(C): 37.1 (2021 03:59), Max: 37.1 (2021 03:59)  T(F): 98.7 (2021 03:59), Max: 98.7 (2021 03:59)  HR: 63 (2021 03:59) (63 - 75)  BP: 140/59 (2021 03:59) (140/59 - 160/49)  BP(mean): --  RR: 17 (2021 03:59) (17 - 18)  SpO2: 100% (2021 03:59) (100% - 100%)  CAPILLARY BLOOD GLUCOSE        I&O's Summary      PHYSICAL EXAM:  GENERAL: NAD, well-developed  HEAD:  Atraumatic, Normocephalic  EYES: EOMI, PERRLA, conjunctiva and sclera clear  CHEST/LUNG: Clear to auscultation bilaterally; No wheeze  HEART: Regular rate and rhythm; , systolic Murmur +   ABDOMEN: Soft, Nontender, Nondistended; Bowel sounds present  EXTREMITIES:  2+ Peripheral Pulses, No clubbing, cyanosis, or edema  PSYCH: AAOx2  NEUROLOGY: non-focal      LABS:                        11.2   5.40  )-----------( 302      ( 2021 21:40 )             36.0         138  |  102  |  21  ----------------------------<  105<H>  3.7   |  26  |  0.54    Ca    9.0      2021 21:41    TPro  6.4  /  Alb  3.5  /  TBili  0.4  /  DBili  x   /  AST  26  /  ALT  6   /  AlkPhos  83            Urinalysis Basic - ( 2021 21:40 )    Color: Yellow / Appearance: Clear / S.033 / pH: x  Gluc: x / Ketone: Trace  / Bili: Negative / Urobili: <2 mg/dL   Blood: x / Protein: 30 mg/dL / Nitrite: Negative   Leuk Esterase: Negative / RBC: 1 /HPF / WBC 1 /HPF   Sq Epi: x / Non Sq Epi: 1 /HPF / Bacteria: Negative        RADIOLOGY & ADDITIONAL TESTS:  < from: CT Chest No Cont (21 @ 02:38) >    No hilar and/or mediastinal adenopathy is noted.    Heart is enlarged in size. Patient is status post repair of the ascending aorta. Displaced calcification is noted within the lumen of the aorta at the level of the aortic arch and the descending thoracic aorta suggestive of aortic dissection. Calcification of the coronary arteries is noted. No pericardial effusion is noted.    No endobronchial lesions are noted. Mild bronchiectasis is noted within the right middle lobe and lingula. Some of the dilated bronchi are filled with mucus. Patchy high attenuation opacities are noted in the right upper lobe. Few small opacities are also noted within the left upper and both lower lobes. No pleural effusions are noted.    Below the diaphragm, visualized portions of the abdomen demonstrate diffuse increased attenuation of the liver.    Degenerative changes of the spine are noted.    Impression: Patchy opacities in the right upper lobe demonstrate increased attenuation. Diffuse increased attenuation of the liver is also noted. The constellation of the above are likely secondary to amiodarone ingestion.    < end of copied text >    Imaging Personally Reviewed:    Consultant(s) Notes Reviewed:      Care Discussed with Consultants/Other Providers:

## 2021-01-25 NOTE — H&P ADULT - HISTORY OF PRESENT ILLNESS
Parkinson's Disease  Afib not on A/C  CHF  PVD  Mechanical soft diet with thin liquids    COVID negative on 1/22/21 *****Patient unable to provide a subjective history due to underlying dementia, encephalopathy, history obtained from medical records, ER*****  Patient is a 76 y/o F PMH Parkinson's Disease w/ dementia (AOX2), AVR on Plavix, CHF, PVD, Afib not on A/C sent in from [facility] for increased confusion. ED d/w daughter, reports that patient was confused yesterday, complaining of dysuria. Patient herself has no complaints.  COVID negative on 1/22/21

## 2021-01-25 NOTE — DISCHARGE NOTE PROVIDER - INSTRUCTIONS
Low salt, soft diet, assist with meals   Ensure Pudding x 3/day   Low salt, soft diet, assist with meals   Ensure Pudding 3x/day

## 2021-01-25 NOTE — PHYSICAL THERAPY INITIAL EVALUATION ADULT - DISCHARGE DISPOSITION, PT EVAL
Home with skilled home PT for gait training, transfer training, strengthening, and to increase independence within the home rehabilitation facility

## 2021-01-25 NOTE — PHYSICAL THERAPY INITIAL EVALUATION ADULT - PERTINENT HX OF CURRENT PROBLEM, REHAB EVAL
Patient is a 75 year old female admitted for metabolic encephelopathy and found to have PNA. PMH listed below

## 2021-01-25 NOTE — DIETITIAN NUTRITION RISK NOTIFICATION - TREATMENT: THE FOLLOWING DIET HAS BEEN RECOMMENDED
Diet, Dysphagia 2 Mechanical Soft-Thin Liquids:   Supplement Feeding Modality:  Oral  Ensure Pudding Cans or Servings Per Day:  1       Frequency:  Three Times a day (01-25-21 @ 12:57) [Pending Verification By Attending]  Diet, Dysphagia 2 Mechanical Soft-Thin Liquids (01-25-21 @ 01:25) [Active]

## 2021-01-25 NOTE — PROGRESS NOTE ADULT - ATTENDING COMMENTS
PT luis mal noted, recommend home PT PT shekhar noted, recommend home PT  Message left for daughter on the phone

## 2021-01-25 NOTE — DIETITIAN INITIAL EVALUATION ADULT. - OTHER INFO
74 y/o female from NH with Parkinson's dementia admitted with dx of PNA. Visited with pt to obtain nutrition hx, however pt was preoccupied with using the bathroom. She stated that she wasn't eating very well though. Attempted to contact pt's daughter, Maddy, however call went to voicemail. RN said that pt has not eaten well today. Nutrition consult ordered for advanced stage pressure injury; pt noted with Stage 1 sacral and suspected DTI of coccyx. Pt additionally exhibits severe subcutaneous fat store loss and muscle mass wasting which place her at severe risk for malnutrition. Recommend Ensure Pudding x 3/day (510 kcal, 12 gm protein). No GI distress. Encourage and monitor oral intake, especially of nutrition supplement. RDN services to remain available as needed.

## 2021-01-25 NOTE — H&P ADULT - PROBLEM SELECTOR PLAN 1
seen on CXR, will check CT chest urgent, c/w vanc/cefepime for now, f/u COVID testing (negative on 1/22 in NH)

## 2021-01-25 NOTE — DISCHARGE NOTE PROVIDER - DETAILS OF MALNUTRITION DIAGNOSIS/DIAGNOSES
This patient has been assessed with a concern for Malnutrition and was treated during this hospitalization for the following Nutrition diagnosis/diagnoses:     -  01/25/2021: Severe protein-calorie malnutrition   -  01/25/2021: Underweight (BMI < 19)   This patient has been assessed with a concern for Malnutrition and was treated during this hospitalization for the following Nutrition diagnosis/diagnoses:     -  01/25/2021: Severe protein-calorie malnutrition   -  01/25/2021: Underweight (BMI < 19)    This patient has been assessed with a concern for Malnutrition and was treated during this hospitalization for the following Nutrition diagnosis/diagnoses:     -  01/25/2021: Severe protein-calorie malnutrition   -  01/25/2021: Underweight (BMI < 19)   This patient has been assessed with a concern for Malnutrition and was treated during this hospitalization for the following Nutrition diagnosis/diagnoses:     -  01/25/2021: Severe protein-calorie malnutrition   -  01/25/2021: Underweight (BMI < 19)    This patient has been assessed with a concern for Malnutrition and was treated during this hospitalization for the following Nutrition diagnosis/diagnoses:     -  01/25/2021: Severe protein-calorie malnutrition   -  01/25/2021: Underweight (BMI < 19)    This patient has been assessed with a concern for Malnutrition and was treated during this hospitalization for the following Nutrition diagnosis/diagnoses:     -  01/25/2021: Severe protein-calorie malnutrition   -  01/25/2021: Underweight (BMI < 19)   This patient has been assessed with a concern for Malnutrition and was treated during this hospitalization for the following Nutrition diagnosis/diagnoses:     -  01/25/2021: Severe protein-calorie malnutrition   -  01/25/2021: Underweight (BMI < 19)    This patient has been assessed with a concern for Malnutrition and was treated during this hospitalization for the following Nutrition diagnosis/diagnoses:     -  01/25/2021: Severe protein-calorie malnutrition   -  01/25/2021: Underweight (BMI < 19)    This patient has been assessed with a concern for Malnutrition and was treated during this hospitalization for the following Nutrition diagnosis/diagnoses:     -  01/25/2021: Severe protein-calorie malnutrition   -  01/25/2021: Underweight (BMI < 19)    This patient has been assessed with a concern for Malnutrition and was treated during this hospitalization for the following Nutrition diagnosis/diagnoses:     -  01/25/2021: Severe protein-calorie malnutrition   -  01/25/2021: Underweight (BMI < 19)

## 2021-01-26 LAB
ALBUMIN SERPL ELPH-MCNC: 3.1 G/DL — LOW (ref 3.3–5)
ALP SERPL-CCNC: 77 U/L — SIGNIFICANT CHANGE UP (ref 40–120)
ALT FLD-CCNC: <5 U/L — SIGNIFICANT CHANGE UP (ref 4–33)
ANION GAP SERPL CALC-SCNC: 8 MMOL/L — SIGNIFICANT CHANGE UP (ref 7–14)
AST SERPL-CCNC: 20 U/L — SIGNIFICANT CHANGE UP (ref 4–32)
BILIRUB SERPL-MCNC: 0.3 MG/DL — SIGNIFICANT CHANGE UP (ref 0.2–1.2)
BUN SERPL-MCNC: 8 MG/DL — SIGNIFICANT CHANGE UP (ref 7–23)
CALCIUM SERPL-MCNC: 8.1 MG/DL — LOW (ref 8.4–10.5)
CHLORIDE SERPL-SCNC: 103 MMOL/L — SIGNIFICANT CHANGE UP (ref 98–107)
CO2 SERPL-SCNC: 26 MMOL/L — SIGNIFICANT CHANGE UP (ref 22–31)
CREAT SERPL-MCNC: 0.44 MG/DL — LOW (ref 0.5–1.3)
GLUCOSE SERPL-MCNC: 120 MG/DL — HIGH (ref 70–99)
HCT VFR BLD CALC: 33.7 % — LOW (ref 34.5–45)
HGB BLD-MCNC: 10.7 G/DL — LOW (ref 11.5–15.5)
MAGNESIUM SERPL-MCNC: 2 MG/DL — SIGNIFICANT CHANGE UP (ref 1.6–2.6)
MCHC RBC-ENTMCNC: 26.9 PG — LOW (ref 27–34)
MCHC RBC-ENTMCNC: 31.8 GM/DL — LOW (ref 32–36)
MCV RBC AUTO: 84.7 FL — SIGNIFICANT CHANGE UP (ref 80–100)
NRBC # BLD: 0 /100 WBCS — SIGNIFICANT CHANGE UP
NRBC # FLD: 0 K/UL — SIGNIFICANT CHANGE UP
PHOSPHATE SERPL-MCNC: 3 MG/DL — SIGNIFICANT CHANGE UP (ref 2.5–4.5)
PLATELET # BLD AUTO: 300 K/UL — SIGNIFICANT CHANGE UP (ref 150–400)
POTASSIUM SERPL-MCNC: 3.3 MMOL/L — LOW (ref 3.5–5.3)
POTASSIUM SERPL-SCNC: 3.3 MMOL/L — LOW (ref 3.5–5.3)
PROT SERPL-MCNC: 5.4 G/DL — LOW (ref 6–8.3)
RBC # BLD: 3.98 M/UL — SIGNIFICANT CHANGE UP (ref 3.8–5.2)
RBC # FLD: 17.5 % — HIGH (ref 10.3–14.5)
SODIUM SERPL-SCNC: 137 MMOL/L — SIGNIFICANT CHANGE UP (ref 135–145)
WBC # BLD: 3.92 K/UL — SIGNIFICANT CHANGE UP (ref 3.8–10.5)
WBC # FLD AUTO: 3.92 K/UL — SIGNIFICANT CHANGE UP (ref 3.8–10.5)

## 2021-01-26 PROCEDURE — 99222 1ST HOSP IP/OBS MODERATE 55: CPT | Mod: GC

## 2021-01-26 PROCEDURE — 99232 SBSQ HOSP IP/OBS MODERATE 35: CPT

## 2021-01-26 RX ORDER — POTASSIUM CHLORIDE 20 MEQ
40 PACKET (EA) ORAL ONCE
Refills: 0 | Status: COMPLETED | OUTPATIENT
Start: 2021-01-26 | End: 2021-01-26

## 2021-01-26 RX ADMIN — CARBIDOPA AND LEVODOPA 1 TABLET(S): 25; 100 TABLET ORAL at 13:06

## 2021-01-26 RX ADMIN — ENOXAPARIN SODIUM 30 MILLIGRAM(S): 100 INJECTION SUBCUTANEOUS at 13:06

## 2021-01-26 RX ADMIN — SENNA PLUS 2 TABLET(S): 8.6 TABLET ORAL at 20:38

## 2021-01-26 RX ADMIN — Medication 40 MILLIEQUIVALENT(S): at 08:23

## 2021-01-26 RX ADMIN — Medication 2.5 MILLIGRAM(S): at 17:41

## 2021-01-26 RX ADMIN — Medication 81 MILLIGRAM(S): at 13:06

## 2021-01-26 RX ADMIN — PANTOPRAZOLE SODIUM 40 MILLIGRAM(S): 20 TABLET, DELAYED RELEASE ORAL at 05:01

## 2021-01-26 RX ADMIN — CARBIDOPA AND LEVODOPA 1 TABLET(S): 25; 100 TABLET ORAL at 17:41

## 2021-01-26 RX ADMIN — Medication 2.5 MILLIGRAM(S): at 05:01

## 2021-01-26 RX ADMIN — CARBIDOPA AND LEVODOPA 1 TABLET(S): 25; 100 TABLET ORAL at 08:23

## 2021-01-26 RX ADMIN — ZINC SULFATE TAB 220 MG (50 MG ZINC EQUIVALENT) 220 MILLIGRAM(S): 220 (50 ZN) TAB at 13:06

## 2021-01-26 RX ADMIN — ZINC OXIDE 1 APPLICATION(S): 200 OINTMENT TOPICAL at 17:41

## 2021-01-26 RX ADMIN — Medication 250 MILLIGRAM(S): at 05:01

## 2021-01-26 RX ADMIN — ZINC OXIDE 1 APPLICATION(S): 200 OINTMENT TOPICAL at 05:01

## 2021-01-26 RX ADMIN — Medication 1 TABLET(S): at 13:06

## 2021-01-26 RX ADMIN — Medication 500 MILLIGRAM(S): at 13:06

## 2021-01-26 RX ADMIN — CLOPIDOGREL BISULFATE 75 MILLIGRAM(S): 75 TABLET, FILM COATED ORAL at 13:06

## 2021-01-26 RX ADMIN — CEFEPIME 100 MILLIGRAM(S): 1 INJECTION, POWDER, FOR SOLUTION INTRAMUSCULAR; INTRAVENOUS at 08:23

## 2021-01-26 NOTE — PROGRESS NOTE ADULT - ATTENDING COMMENTS
PT shekhar noted, , recommend rehab PT shekhar noted, , recommend rehab  plan of care d/w Daughter Kendall on the phone

## 2021-01-26 NOTE — CONSULT NOTE ADULT - SUBJECTIVE AND OBJECTIVE BOX
Patient is a 75y old  Female who presents with a chief complaint of AMS (2021 09:36)    HPI:  75 F Parkinson's dementia ( reported baseline AAOx2), AVR, PVD, CHF was sent from NH for AMS. Pt is only partially oriented ( to person, year) only able to provide limited history. She denies SOB, cough, fever, chills, dysuria, diarrhea. Admits to possible urinary frequency recently, cannot say for how long.    In the hospital pt is afebrile without leukocytosis, SPO2 100% on RA. COVID PCR negative and report of negative COVID testing on 21 at NH as well.  CXR showed a questionable RML opacity for which pt was started on vancomycin and cefepime. CT Chest without contrast showed several patchy opacities in RUL with increased attenuation as well as increased attenuation in liver, c/f amiodarone ingestion. Also with RML bronchiectasis.      prior hospital charts reviewed [ x ]  primary team notes reviewed [  x]  other consultant notes reviewed [x  ]  PAST MEDICAL & SURGICAL HISTORY:  Dementia    Parkinson disease    No significant past surgical history      Allergies  No Known Allergies    ANTIMICROBIALS (past 90 days)  MEDICATIONS  (STANDING):  cefepime   IVPB   100 mL/Hr IV Intermittent (21 @ 08:23)   100 mL/Hr IV Intermittent (21 @ 23:45)   100 mL/Hr IV Intermittent (21 @ 16:48)   100 mL/Hr IV Intermittent (21 @ 09:00)    cefepime   IVPB   100 mL/Hr IV Intermittent (21 @ 00:13)    vancomycin  IVPB   250 mL/Hr IV Intermittent (21 @ 05:01)   250 mL/Hr IV Intermittent (21 @ 05:55)      ANTIMICROBIALS:    cefepime   IVPB 1000 every 8 hours  vancomycin  IVPB 750 every 24 hours    OTHER MEDS: MEDICATIONS  (STANDING):  aspirin enteric coated 81 daily  carbidopa/levodopa  25/100 1 <User Schedule>  clopidogrel Tablet 75 daily  dronabinol 2.5 two times a day  enoxaparin Injectable 30 daily  pantoprazole    Tablet 40 before breakfast  senna 2 at bedtime    SOCIAL HISTORY:   from [facility] denies smoking, alcohol use    FAMILY HISTORY:  DMII in maternal grandmother      REVIEW OF SYSTEMS  [  ] ROS unobtainable because:    [x  ] All other systems negative except as noted below:	    Constitutional:  [ ] fever [ ] chills  [ ] weight loss  [ ] weakness  Skin:  [ ] rash [ ] phlebitis	  Eyes: [ ] icterus [ ] pain  [ ] discharge	  ENMT: [ ] sore throat  [ ] thrush [ ] ulcers [ ] exudates  Respiratory: [ ] dyspnea [ ] hemoptysis [ ] cough [ ] sputum	  Cardiovascular:  [ ] chest pain [ ] palpitations [ ] edema	  Gastrointestinal:  [ ] nausea [ ] vomiting [ ] diarrhea [ ] constipation [ ] pain	  Genitourinary:  [ ] dysuria [x ] frequency [ ] hematuria [ ] discharge [ ] flank pain  [ ] incontinence  Musculoskeletal:  [ ] myalgias [ ] arthralgias [ ] arthritis  [ ] back pain  Neurological:  [ ] headache [ ] seizures  [ ] confusion/altered mental status  Psychiatric:  [ ] anxiety [ ] depression	  Hematology/Lymphatics:  [ ] lymphadenopathy  Endocrine:  [ ] adrenal [ ] thyroid  Allergic/Immunologic:	 [ ] transplant [ ] seasonal    Vital Signs Last 24 Hrs  T(F): 98.4 (21 @ 04:59), Max: 98.9 (21 @ 20:59)  Vital Signs Last 24 Hrs  HR: 68 (21 @ 04:59) (68 - 72)  BP: 138/62 (21 @ 04:59) (138/62 - 149/63)  RR: 17 (21 @ 04:59)  SpO2: 100% (21 @ 04:59) (100% - 100%)  Wt(kg): --    PHYSICAL EXAM:  Constitutional: non-toxic, no distress, not tachypneic  HEAD/EYES: anicteric, no conjunctival injection  ENT:  supple  Cardiovascular:   normal S1, S2, no murmur, no edema  Respiratory:  clear BS bilaterally, no wheezes, no rales  GI:  soft, non-tender, normal bowel sounds  :  no jones, no CVA tenderness  Musculoskeletal:  no synovitis, normal ROM  Neurologic: awake and alert, normal strength, no focal findings  Skin:  no rash, no erythema, no phlebitis  Heme/Onc: no lymphadenopathy   Psychiatric:  awake, alert, oriented to person and year, not to place or situation                            10.7   3.92  )-----------( 300      ( 2021 06:50 )             33.7         137  |  103  |  8   ----------------------------<  120<H>  3.3<L>   |  26  |  0.44<L>    Ca    8.1<L>      2021 06:57  Phos  3.0       Mg     2.0         TPro  5.4<L>  /  Alb  3.1<L>  /  TBili  0.3  /  DBili  x   /  AST  20  /  ALT  <5  /  AlkPhos  77      Urinalysis Basic - ( 2021 21:40 )    Color: Yellow / Appearance: Clear / S.033 / pH: x  Gluc: x / Ketone: Trace  / Bili: Negative / Urobili: <2 mg/dL   Blood: x / Protein: 30 mg/dL / Nitrite: Negative   Leuk Esterase: Negative / RBC: 1 /HPF / WBC 1 /HPF   Sq Epi: x / Non Sq Epi: 1 /HPF / Bacteria: Negative    MICROBIOLOGY:  Culture - Blood (collected 2021 08:36)  Source: .Blood Blood-Peripheral  Preliminary Report (2021 09:01):    No growth to date.    Culture - Blood (collected 2021 08:36)  Source: .Blood Blood-Peripheral  Preliminary Report (2021 09:01):    No growth to date.    Culture - Urine (collected 2021 21:18)  Source: .Urine Clean Catch (Midstream)  Final Report (2021 21:55):    <10,000 CFU/mL Normal Urogenital Tammy                  RADIOLOGY:  < from: CT Chest No Cont (21 @ 02:38) >  INTERPRETATION:  Clinical information: Confusion.    CT scan of the chest was obtained without administration of intravenous contrast.    No hilar and/or mediastinal adenopathy is noted.    Heart is enlarged in size. Patient is status post repair of the ascending aorta. Displaced calcification is noted within the lumen of the aorta at the level of the aortic arch and the descending thoracic aorta suggestive of aortic dissection. Calcification of the coronary arteries is noted. No pericardial effusion is noted.    No endobronchial lesions are noted. Mild bronchiectasis is noted within the right middle lobe and lingula. Some of the dilated bronchi are filled with mucus. Patchy high attenuation opacities are noted in the right upper lobe. Few small opacities are also noted within the left upper and both lower lobes. No pleural effusions are noted.    Below the diaphragm, visualized portions of the abdomen demonstrate diffuse increased attenuation of the liver.    Degenerative changes of the spine are noted.    Impression: Patchy opacities in the right upper lobe demonstrate increased attenuation. Diffuse increased attenuation of the liver is also noted. The constellation of the above are likely secondary to amiodarone ingestion.      < end of copied text >  < from: CT Head No Cont (21 @ 02:37) >    IMPRESSION: No acute intracranial hemorrhage, mass effect, or shift of the midline structures.    Mild chronic white matter microvascular type changes.

## 2021-01-26 NOTE — PROGRESS NOTE ADULT - SUBJECTIVE AND OBJECTIVE BOX
Patient is a 75y old  Female who presents with a chief complaint of AMS (2021 14:59)      SUBJECTIVE / OVERNIGHT EVENTS:    MEDICATIONS  (STANDING):  ascorbic acid 500 milliGRAM(s) Oral daily  aspirin enteric coated 81 milliGRAM(s) Oral daily  carbidopa/levodopa  25/100 1 Tablet(s) Oral <User Schedule>  cefepime   IVPB 1000 milliGRAM(s) IV Intermittent every 8 hours  clopidogrel Tablet 75 milliGRAM(s) Oral daily  dronabinol 2.5 milliGRAM(s) Oral two times a day  enoxaparin Injectable 30 milliGRAM(s) SubCutaneous daily  multivitamin 1 Tablet(s) Oral daily  pantoprazole    Tablet 40 milliGRAM(s) Oral before breakfast  senna 2 Tablet(s) Oral at bedtime  vancomycin  IVPB 750 milliGRAM(s) IV Intermittent every 24 hours  zinc oxide 20% Ointment 1 Application(s) Topical two times a day  zinc sulfate 220 milliGRAM(s) Oral daily    MEDICATIONS  (PRN):      Vital Signs Last 24 Hrs  T(C): 36.9 (2021 04:59), Max: 37.2 (2021 20:59)  T(F): 98.4 (2021 04:59), Max: 98.9 (2021 20:59)  HR: 68 (2021 04:59) (68 - 72)  BP: 138/62 (2021 04:59) (128/64 - 149/63)  BP(mean): --  RR: 17 (2021 04:59) (17 - 18)  SpO2: 100% (2021 04:59) (100% - 100%)  CAPILLARY BLOOD GLUCOSE        I&O's Summary      PHYSICAL EXAM:  GENERAL: NAD, well-developed  HEAD:  Atraumatic, Normocephalic  EYES: EOMI, PERRLA, conjunctiva and sclera clear  NECK: Supple, No JVD  CHEST/LUNG: Clear to auscultation bilaterally; No wheeze  HEART: Regular rate and rhythm; No murmurs, rubs, or gallops  ABDOMEN: Soft, Nontender, Nondistended; Bowel sounds present  EXTREMITIES:  2+ Peripheral Pulses, No clubbing, cyanosis, or edema  PSYCH: AAOx3  NEUROLOGY: non-focal  SKIN: No rashes or lesions    LABS:                        10.7   3.92  )-----------( 300      ( 2021 06:50 )             33.7         137  |  103  |  8   ----------------------------<  120<H>  3.3<L>   |  26  |  0.44<L>    Ca    8.1<L>      2021 06:57  Phos  3.0       Mg     2.0         TPro  5.4<L>  /  Alb  3.1<L>  /  TBili  0.3  /  DBili  x   /  AST  20  /  ALT  <5  /  AlkPhos  77            Urinalysis Basic - ( 2021 21:40 )    Color: Yellow / Appearance: Clear / S.033 / pH: x  Gluc: x / Ketone: Trace  / Bili: Negative / Urobili: <2 mg/dL   Blood: x / Protein: 30 mg/dL / Nitrite: Negative   Leuk Esterase: Negative / RBC: 1 /HPF / WBC 1 /HPF   Sq Epi: x / Non Sq Epi: 1 /HPF / Bacteria: Negative        RADIOLOGY & ADDITIONAL TESTS:    Imaging Personally Reviewed:    Consultant(s) Notes Reviewed:      Care Discussed with Consultants/Other Providers:   Patient is a 75y old  Female who presents with a chief complaint of AMS (2021 14:59)      SUBJECTIVE / OVERNIGHT EVENTS: patient seen and examined by bedside, no acute distress noted  no acute events over night        MEDICATIONS  (STANDING):  ascorbic acid 500 milliGRAM(s) Oral daily  aspirin enteric coated 81 milliGRAM(s) Oral daily  carbidopa/levodopa  25/100 1 Tablet(s) Oral <User Schedule>  cefepime   IVPB 1000 milliGRAM(s) IV Intermittent every 8 hours  clopidogrel Tablet 75 milliGRAM(s) Oral daily  dronabinol 2.5 milliGRAM(s) Oral two times a day  enoxaparin Injectable 30 milliGRAM(s) SubCutaneous daily  multivitamin 1 Tablet(s) Oral daily  pantoprazole    Tablet 40 milliGRAM(s) Oral before breakfast  senna 2 Tablet(s) Oral at bedtime  vancomycin  IVPB 750 milliGRAM(s) IV Intermittent every 24 hours  zinc oxide 20% Ointment 1 Application(s) Topical two times a day  zinc sulfate 220 milliGRAM(s) Oral daily    MEDICATIONS  (PRN):      Vital Signs Last 24 Hrs  T(C): 36.9 (2021 04:59), Max: 37.2 (2021 20:59)  T(F): 98.4 (2021 04:59), Max: 98.9 (2021 20:59)  HR: 68 (2021 04:59) (68 - 72)  BP: 138/62 (2021 04:59) (128/64 - 149/63)  BP(mean): --  RR: 17 (2021 04:59) (17 - 18)  SpO2: 100% (2021 04:59) (100% - 100%)  CAPILLARY BLOOD GLUCOSE        I&O's Summary      PHYSICAL EXAM:  GENERAL: NAD, well-developed  HEAD:  Atraumatic, Normocephalic  EYES: EOMI, PERRLA, conjunctiva and sclera clear  CHEST/LUNG: Clear to auscultation bilaterally; No wheeze  HEART: Regular rate and rhythm; , systolic Murmur +   ABDOMEN: Soft, Nontender, Nondistended; Bowel sounds present  EXTREMITIES:  2+ Peripheral Pulses, No clubbing, cyanosis, or edema  PSYCH: AAOx2  NEUROLOGY: non-focal      LABS:                        10.7   3.92  )-----------( 300      ( 2021 06:50 )             33.7         137  |  103  |  8   ----------------------------<  120<H>  3.3<L>   |  26  |  0.44<L>    Ca    8.1<L>      2021 06:57  Phos  3.0       Mg     2.0         TPro  5.4<L>  /  Alb  3.1<L>  /  TBili  0.3  /  DBili  x   /  AST  20  /  ALT  <5  /  AlkPhos  77            Urinalysis Basic - ( 2021 21:40 )    Color: Yellow / Appearance: Clear / S.033 / pH: x  Gluc: x / Ketone: Trace  / Bili: Negative / Urobili: <2 mg/dL   Blood: x / Protein: 30 mg/dL / Nitrite: Negative   Leuk Esterase: Negative / RBC: 1 /HPF / WBC 1 /HPF   Sq Epi: x / Non Sq Epi: 1 /HPF / Bacteria: Negative        RADIOLOGY & ADDITIONAL TESTS:    Imaging Personally Reviewed:    Consultant(s) Notes Reviewed:      Care Discussed with Consultants/Other Providers:

## 2021-01-26 NOTE — CONSULT NOTE ADULT - ATTENDING COMMENTS
75F with parkinson's dementia with AMS, lung opacities  -no symptoms otherwise  -no fever  -normal WBC  -dc abx and observe  -?aspiration  -cx and COVID swab negative     Eder Sewell  Attending Physician   Division of Infectious Disease  Pager #[phone]  Available on Microsoft Teams also  After 5pm/weekend or no response, call #[phone]

## 2021-01-26 NOTE — PROGRESS NOTE ADULT - PROBLEM SELECTOR PLAN 2
f/u CT head, no sig lab abnormalities, treat PNA as above f/u CT head, no sig lab abnormalities, treat PNA as above  hypokalemia: will supplement , monitor BMP

## 2021-01-26 NOTE — CONSULT NOTE ADULT - ASSESSMENT
75 F Parkinson's dementia ( reported baseline AAOx2), AVR, PVD, CHF was sent from NH for AMS, no signs of sepsis but patient was treated for HCAP due to CXR with RML opacity, CT Chest without clear evidence of pneumonia.    Rule out pneumonia  -CXR read as RML opacitiy which appears to be bronchiectasis on CT Chest read, vague RUL opacities though to be related to amiodarone ingestion  -afebrile without leukocytosis, no cough, SPO2 100% on RA not c/w pneumonia  -Bcx NTD x 2  -no other signs of infection  -further recs pending 75 F Parkinson's dementia ( reported baseline AAOx2), AVR, PVD, CHF was sent from NH for AMS, no signs of sepsis but patient was treated for HCAP due to CXR with RML opacity, CT Chest without clear evidence of pneumonia.    Rule out pneumonia  -CXR read as RML opacitiy which appears to be bronchiectasis on CT Chest read, vague RUL opacities though to be related to amiodarone ingestion  -afebrile without leukocytosis, no cough, SPO2 100% on RA not c/w pneumonia  -Bcx NTD x 2  -no other signs of infection  -would discontinue antibiotics as patient does not have evidence of infection

## 2021-01-26 NOTE — PROGRESS NOTE ADULT - PROBLEM SELECTOR PLAN 1
seen on CXR, CT chest noted :Patchy opacities in the right upper lobe  as above , c/w vanc/cefepime for now,    COVID testing  negative again on 1/24 (negative on 1/22 in NH)  f/u blood and urine cx seen on CXR, CT chest noted :Patchy opacities in the right upper lobe  as above , c/w vanc/cefepime for now,    COVID testing  negative again on 1/24 (negative on 1/22 in NH)  f/u blood and urine cx, currently NGTD   ID eval requested for Abx management for DC planning

## 2021-01-27 ENCOUNTER — TRANSCRIPTION ENCOUNTER (OUTPATIENT)
Age: 76
End: 2021-01-27

## 2021-01-27 VITALS
HEART RATE: 75 BPM | DIASTOLIC BLOOD PRESSURE: 60 MMHG | SYSTOLIC BLOOD PRESSURE: 138 MMHG | RESPIRATION RATE: 18 BRPM | TEMPERATURE: 98 F | OXYGEN SATURATION: 100 %

## 2021-01-27 DIAGNOSIS — Z29.9 ENCOUNTER FOR PROPHYLACTIC MEASURES, UNSPECIFIED: ICD-10-CM

## 2021-01-27 DIAGNOSIS — I50.9 HEART FAILURE, UNSPECIFIED: ICD-10-CM

## 2021-01-27 PROBLEM — G20 PARKINSON'S DISEASE: Chronic | Status: ACTIVE | Noted: 2021-01-25

## 2021-01-27 PROBLEM — F03.90 UNSPECIFIED DEMENTIA, UNSPECIFIED SEVERITY, WITHOUT BEHAVIORAL DISTURBANCE, PSYCHOTIC DISTURBANCE, MOOD DISTURBANCE, AND ANXIETY: Chronic | Status: ACTIVE | Noted: 2021-01-25

## 2021-01-27 PROCEDURE — 99239 HOSP IP/OBS DSCHRG MGMT >30: CPT

## 2021-01-27 RX ORDER — POLYETHYLENE GLYCOL 3350 17 G/17G
17 POWDER, FOR SOLUTION ORAL
Qty: 0 | Refills: 0 | DISCHARGE

## 2021-01-27 RX ORDER — SENNA PLUS 8.6 MG/1
1 TABLET ORAL
Qty: 0 | Refills: 0 | DISCHARGE

## 2021-01-27 RX ORDER — ZINC OXIDE 200 MG/G
1 OINTMENT TOPICAL
Qty: 0 | Refills: 0 | DISCHARGE
Start: 2021-01-27

## 2021-01-27 RX ADMIN — Medication 2.5 MILLIGRAM(S): at 05:09

## 2021-01-27 RX ADMIN — CARBIDOPA AND LEVODOPA 1 TABLET(S): 25; 100 TABLET ORAL at 13:45

## 2021-01-27 RX ADMIN — ZINC OXIDE 1 APPLICATION(S): 200 OINTMENT TOPICAL at 05:09

## 2021-01-27 RX ADMIN — PANTOPRAZOLE SODIUM 40 MILLIGRAM(S): 20 TABLET, DELAYED RELEASE ORAL at 05:09

## 2021-01-27 NOTE — PROGRESS NOTE ADULT - PROBLEM SELECTOR PLAN 1
seen on CXR, CT chest noted :Patchy opacities in the right upper lobe  as above , c/w vanc/cefepime for now,    COVID testing  negative again on 1/24 (negative on 1/22 in NH)  f/u blood and urine cx, currently NGTD   ID eval requested for Abx management for DC planning seen on CXR, CT chest noted :Patchy opacities in the right upper lobe  as above , was started on  vanc/cefepime    COVID testing  negative again on 1/24 (negative on 1/22 in NH)   blood and urine cx, currently NGTD   ID eval requested for Abx management for DC planning, recommend to monitor off abx as pt afebrile, no leucocytosis, nontoxic

## 2021-01-27 NOTE — PROGRESS NOTE ADULT - SUBJECTIVE AND OBJECTIVE BOX
Patient is a 75y old  Female who presents with a chief complaint of AMS (26 Jan 2021 13:42)      SUBJECTIVE / OVERNIGHT EVENTS:    MEDICATIONS  (STANDING):  ascorbic acid 500 milliGRAM(s) Oral daily  aspirin enteric coated 81 milliGRAM(s) Oral daily  carbidopa/levodopa  25/100 1 Tablet(s) Oral <User Schedule>  clopidogrel Tablet 75 milliGRAM(s) Oral daily  dronabinol 2.5 milliGRAM(s) Oral two times a day  enoxaparin Injectable 30 milliGRAM(s) SubCutaneous daily  multivitamin 1 Tablet(s) Oral daily  pantoprazole    Tablet 40 milliGRAM(s) Oral before breakfast  senna 2 Tablet(s) Oral at bedtime  zinc oxide 20% Ointment 1 Application(s) Topical two times a day  zinc sulfate 220 milliGRAM(s) Oral daily    MEDICATIONS  (PRN):      Vital Signs Last 24 Hrs  T(C): 36.7 (27 Jan 2021 13:02), Max: 36.8 (26 Jan 2021 20:37)  T(F): 98 (27 Jan 2021 13:02), Max: 98.2 (26 Jan 2021 20:37)  HR: 75 (27 Jan 2021 13:02) (68 - 77)  BP: 138/60 (27 Jan 2021 13:02) (121/60 - 147/69)  BP(mean): --  RR: 18 (27 Jan 2021 13:02) (18 - 20)  SpO2: 100% (27 Jan 2021 13:02) (100% - 100%)  CAPILLARY BLOOD GLUCOSE        I&O's Summary      PHYSICAL EXAM:  GENERAL: NAD, well-developed  HEAD:  Atraumatic, Normocephalic  EYES: EOMI, PERRLA, conjunctiva and sclera clear  NECK: Supple, No JVD  CHEST/LUNG: Clear to auscultation bilaterally; No wheeze  HEART: Regular rate and rhythm; No murmurs, rubs, or gallops  ABDOMEN: Soft, Nontender, Nondistended; Bowel sounds present  EXTREMITIES:  2+ Peripheral Pulses, No clubbing, cyanosis, or edema  PSYCH: AAOx3  NEUROLOGY: non-focal  SKIN: No rashes or lesions    LABS:                        10.7   3.92  )-----------( 300      ( 26 Jan 2021 06:50 )             33.7     01-26    137  |  103  |  8   ----------------------------<  120<H>  3.3<L>   |  26  |  0.44<L>    Ca    8.1<L>      26 Jan 2021 06:57  Phos  3.0     01-26  Mg     2.0     01-26    TPro  5.4<L>  /  Alb  3.1<L>  /  TBili  0.3  /  DBili  x   /  AST  20  /  ALT  <5  /  AlkPhos  77  01-26              RADIOLOGY & ADDITIONAL TESTS:    Imaging Personally Reviewed:    Consultant(s) Notes Reviewed:      Care Discussed with Consultants/Other Providers:   Patient is a 75y old  Female who presents with a chief complaint of AMS (26 Jan 2021 13:42)      SUBJECTIVE / OVERNIGHT EVENTS: patient seen and examined by bedside, pt c/o soft stool today, , denies headache, dizziness, SOB, CP, Palpitations , N/V/ abdominal pain        MEDICATIONS  (STANDING):  ascorbic acid 500 milliGRAM(s) Oral daily  aspirin enteric coated 81 milliGRAM(s) Oral daily  carbidopa/levodopa  25/100 1 Tablet(s) Oral <User Schedule>  clopidogrel Tablet 75 milliGRAM(s) Oral daily  dronabinol 2.5 milliGRAM(s) Oral two times a day  enoxaparin Injectable 30 milliGRAM(s) SubCutaneous daily  multivitamin 1 Tablet(s) Oral daily  pantoprazole    Tablet 40 milliGRAM(s) Oral before breakfast  senna 2 Tablet(s) Oral at bedtime  zinc oxide 20% Ointment 1 Application(s) Topical two times a day  zinc sulfate 220 milliGRAM(s) Oral daily    MEDICATIONS  (PRN):      Vital Signs Last 24 Hrs  T(C): 36.7 (27 Jan 2021 13:02), Max: 36.8 (26 Jan 2021 20:37)  T(F): 98 (27 Jan 2021 13:02), Max: 98.2 (26 Jan 2021 20:37)  HR: 75 (27 Jan 2021 13:02) (68 - 77)  BP: 138/60 (27 Jan 2021 13:02) (121/60 - 147/69)  BP(mean): --  RR: 18 (27 Jan 2021 13:02) (18 - 20)  SpO2: 100% (27 Jan 2021 13:02) (100% - 100%)  CAPILLARY BLOOD GLUCOSE        I&O's Summary      PHYSICAL EXAM:  GENERAL: NAD, frail, thin bulit   HEAD:  Atraumatic, Normocephalic  EYES: EOMI, PERRLA, conjunctiva and sclera clear  CHEST/LUNG: Clear to auscultation bilaterally; No wheeze  HEART: Regular rate and rhythm; , systolic Murmur +   ABDOMEN: Soft, Nontender, Nondistended; Bowel sounds present  EXTREMITIES:  2+ Peripheral Pulses, No clubbing, cyanosis, or edema  PSYCH: AAOx2-3   NEUROLOGY: non-focal      LABS:                        10.7   3.92  )-----------( 300      ( 26 Jan 2021 06:50 )             33.7     01-26    137  |  103  |  8   ----------------------------<  120<H>  3.3<L>   |  26  |  0.44<L>    Ca    8.1<L>      26 Jan 2021 06:57  Phos  3.0     01-26  Mg     2.0     01-26    TPro  5.4<L>  /  Alb  3.1<L>  /  TBili  0.3  /  DBili  x   /  AST  20  /  ALT  <5  /  AlkPhos  77  01-26              RADIOLOGY & ADDITIONAL TESTS:    Imaging Personally Reviewed:    Consultant(s) Notes Reviewed:  ID     Care Discussed with Consultants/Other Providers:

## 2021-01-27 NOTE — PROGRESS NOTE ADULT - NUTRITIONAL ASSESSMENT
This patient has been assessed with a concern for Malnutrition and has been determined to have a diagnosis/diagnoses of Severe protein-calorie malnutrition and Underweight (BMI < 19).    This patient is being managed with:   Diet Dysphagia 2 Mechanical Soft-Thin Liquids-  Supplement Feeding Modality:  Oral  Ensure Pudding Cans or Servings Per Day:  1       Frequency:  Two Times a day  Entered: Jan 25 2021  2:57PM    Diet Dysphagia 2 Mechanical Soft-Thin Liquids-  Supplement Feeding Modality:  Oral  Ensure Pudding Cans or Servings Per Day:  1       Frequency:  Three Times a day  Entered: Jan 25 2021 12:56PM    The following pending diet order is being considered for treatment of Severe protein-calorie malnutrition and Underweight (BMI < 19):null
This patient has been assessed with a concern for Malnutrition and has been determined to have a diagnosis/diagnoses of Severe protein-calorie malnutrition and Underweight (BMI < 19).    This patient is being managed with:   Diet Dysphagia 2 Mechanical Soft-Thin Liquids-  Supplement Feeding Modality:  Oral  Ensure Pudding Cans or Servings Per Day:  1       Frequency:  Two Times a day  Entered: Jan 25 2021  2:57PM    Diet Dysphagia 2 Mechanical Soft-Thin Liquids-  Supplement Feeding Modality:  Oral  Ensure Pudding Cans or Servings Per Day:  1       Frequency:  Three Times a day  Entered: Jan 25 2021 12:56PM    The following pending diet order is being considered for treatment of Severe protein-calorie malnutrition and Underweight (BMI < 19):null

## 2021-01-27 NOTE — DISCHARGE NOTE NURSING/CASE MANAGEMENT/SOCIAL WORK - PATIENT PORTAL LINK FT
You can access the FollowMyHealth Patient Portal offered by [facility] by registering at the following website: [email or web address]. By joining [facility]’s FollowMyHealth portal, you will also be able to view your health information using other applications (apps) compatible with our system.

## 2021-01-27 NOTE — PROGRESS NOTE ADULT - PROBLEM SELECTOR PROBLEM 1
HCAP (healthcare-associated pneumonia)

## 2021-01-27 NOTE — PROGRESS NOTE ADULT - PROBLEM SELECTOR PLAN 2
f/u CT head, no sig lab abnormalities, treat PNA as above  hypokalemia: will supplement , monitor BMP f/u CT head, no sig lab abnormalities,   hypokalemia: supplemented  ,

## 2021-01-27 NOTE — PROGRESS NOTE ADULT - ATTENDING COMMENTS
DC planning to rehab  Patient hemodynamically stable for discharge   Time spent in discharge process is 40 min  plan of care was d/w daughter DC planning to rehab  Patient hemodynamically stable for discharge   Time spent in discharge process is 40 min  plan of care was d/w daughter Maddy  Message left for PMD with call back number

## 2021-01-27 NOTE — PROGRESS NOTE ADULT - PROBLEM SELECTOR PLAN 4
AVR on Plavix, CHF, PVD, Afib not on A/C   no evidence of exacerbation  c/w asa , Plavix  not on any other treatment

## 2021-01-30 LAB
CULTURE RESULTS: SIGNIFICANT CHANGE UP
CULTURE RESULTS: SIGNIFICANT CHANGE UP
SPECIMEN SOURCE: SIGNIFICANT CHANGE UP
SPECIMEN SOURCE: SIGNIFICANT CHANGE UP

## 2022-01-19 VITALS
DIASTOLIC BLOOD PRESSURE: 79 MMHG | SYSTOLIC BLOOD PRESSURE: 127 MMHG | TEMPERATURE: 98 F | HEIGHT: 56 IN | HEART RATE: 90 BPM | RESPIRATION RATE: 20 BRPM | WEIGHT: 80.03 LBS | OXYGEN SATURATION: 99 %

## 2022-01-19 LAB
ALBUMIN SERPL ELPH-MCNC: 4.2 G/DL — SIGNIFICANT CHANGE UP (ref 3.3–5)
ALP SERPL-CCNC: 64 U/L — SIGNIFICANT CHANGE UP (ref 40–120)
ALT FLD-CCNC: 12 U/L — SIGNIFICANT CHANGE UP (ref 10–45)
ANION GAP SERPL CALC-SCNC: 14 MMOL/L — SIGNIFICANT CHANGE UP (ref 5–17)
APPEARANCE UR: CLEAR — SIGNIFICANT CHANGE UP
APTT BLD: 29.8 SEC — SIGNIFICANT CHANGE UP (ref 27.5–35.5)
AST SERPL-CCNC: 23 U/L — SIGNIFICANT CHANGE UP (ref 10–40)
BACTERIA # UR AUTO: NEGATIVE — SIGNIFICANT CHANGE UP
BILIRUB SERPL-MCNC: 0.2 MG/DL — SIGNIFICANT CHANGE UP (ref 0.2–1.2)
BILIRUB UR-MCNC: NEGATIVE — SIGNIFICANT CHANGE UP
BLD GP AB SCN SERPL QL: NEGATIVE — SIGNIFICANT CHANGE UP
BUN SERPL-MCNC: 19 MG/DL — SIGNIFICANT CHANGE UP (ref 7–23)
CALCIUM SERPL-MCNC: 9.4 MG/DL — SIGNIFICANT CHANGE UP (ref 8.4–10.5)
CHLORIDE SERPL-SCNC: 100 MMOL/L — SIGNIFICANT CHANGE UP (ref 96–108)
CO2 SERPL-SCNC: 21 MMOL/L — LOW (ref 22–31)
COLOR SPEC: SIGNIFICANT CHANGE UP
CREAT SERPL-MCNC: 0.63 MG/DL — SIGNIFICANT CHANGE UP (ref 0.5–1.3)
DIFF PNL FLD: NEGATIVE — SIGNIFICANT CHANGE UP
EPI CELLS # UR: 3 /HPF — SIGNIFICANT CHANGE UP
GLUCOSE SERPL-MCNC: 96 MG/DL — SIGNIFICANT CHANGE UP (ref 70–99)
GLUCOSE UR QL: NEGATIVE — SIGNIFICANT CHANGE UP
HCT VFR BLD CALC: 39.5 % — SIGNIFICANT CHANGE UP (ref 34.5–45)
HCT VFR BLD CALC: 41.2 % — SIGNIFICANT CHANGE UP (ref 34.5–45)
HGB BLD-MCNC: 13.2 G/DL — SIGNIFICANT CHANGE UP (ref 11.5–15.5)
HGB BLD-MCNC: 13.3 G/DL — SIGNIFICANT CHANGE UP (ref 11.5–15.5)
INR BLD: 0.97 RATIO — SIGNIFICANT CHANGE UP (ref 0.88–1.16)
KETONES UR-MCNC: NEGATIVE — SIGNIFICANT CHANGE UP
LEUKOCYTE ESTERASE UR-ACNC: NEGATIVE — SIGNIFICANT CHANGE UP
MCHC RBC-ENTMCNC: 28.5 PG — SIGNIFICANT CHANGE UP (ref 27–34)
MCHC RBC-ENTMCNC: 28.8 PG — SIGNIFICANT CHANGE UP (ref 27–34)
MCHC RBC-ENTMCNC: 32.3 GM/DL — SIGNIFICANT CHANGE UP (ref 32–36)
MCHC RBC-ENTMCNC: 33.4 GM/DL — SIGNIFICANT CHANGE UP (ref 32–36)
MCV RBC AUTO: 86.2 FL — SIGNIFICANT CHANGE UP (ref 80–100)
MCV RBC AUTO: 88.2 FL — SIGNIFICANT CHANGE UP (ref 80–100)
NITRITE UR-MCNC: NEGATIVE — SIGNIFICANT CHANGE UP
NRBC # BLD: 0 /100 WBCS — SIGNIFICANT CHANGE UP (ref 0–0)
NRBC # BLD: 0 /100 WBCS — SIGNIFICANT CHANGE UP (ref 0–0)
PH UR: 6.5 — SIGNIFICANT CHANGE UP (ref 5–8)
PLATELET # BLD AUTO: 247 K/UL — SIGNIFICANT CHANGE UP (ref 150–400)
PLATELET # BLD AUTO: 260 K/UL — SIGNIFICANT CHANGE UP (ref 150–400)
POTASSIUM SERPL-MCNC: 4.3 MMOL/L — SIGNIFICANT CHANGE UP (ref 3.5–5.3)
POTASSIUM SERPL-SCNC: 4.3 MMOL/L — SIGNIFICANT CHANGE UP (ref 3.5–5.3)
PROT SERPL-MCNC: 7.2 G/DL — SIGNIFICANT CHANGE UP (ref 6–8.3)
PROT UR-MCNC: ABNORMAL
PROTHROM AB SERPL-ACNC: 11.7 SEC — SIGNIFICANT CHANGE UP (ref 10.6–13.6)
RBC # BLD: 4.58 M/UL — SIGNIFICANT CHANGE UP (ref 3.8–5.2)
RBC # BLD: 4.67 M/UL — SIGNIFICANT CHANGE UP (ref 3.8–5.2)
RBC # FLD: 13 % — SIGNIFICANT CHANGE UP (ref 10.3–14.5)
RBC # FLD: 13.1 % — SIGNIFICANT CHANGE UP (ref 10.3–14.5)
RBC CASTS # UR COMP ASSIST: 1 /HPF — SIGNIFICANT CHANGE UP (ref 0–4)
RH IG SCN BLD-IMP: POSITIVE — SIGNIFICANT CHANGE UP
SODIUM SERPL-SCNC: 135 MMOL/L — SIGNIFICANT CHANGE UP (ref 135–145)
SP GR SPEC: 1.02 — SIGNIFICANT CHANGE UP (ref 1.01–1.02)
UROBILINOGEN FLD QL: NEGATIVE — SIGNIFICANT CHANGE UP
WBC # BLD: 6.83 K/UL — SIGNIFICANT CHANGE UP (ref 3.8–10.5)
WBC # BLD: 7.03 K/UL — SIGNIFICANT CHANGE UP (ref 3.8–10.5)
WBC # FLD AUTO: 6.83 K/UL — SIGNIFICANT CHANGE UP (ref 3.8–10.5)
WBC # FLD AUTO: 7.03 K/UL — SIGNIFICANT CHANGE UP (ref 3.8–10.5)
WBC UR QL: 1 /HPF — SIGNIFICANT CHANGE UP (ref 0–5)

## 2022-01-19 PROCEDURE — 70450 CT HEAD/BRAIN W/O DYE: CPT | Mod: 26,MA

## 2022-01-19 PROCEDURE — 74174 CTA ABD&PLVS W/CONTRAST: CPT | Mod: 26,MA

## 2022-01-19 PROCEDURE — 71045 X-RAY EXAM CHEST 1 VIEW: CPT | Mod: 26

## 2022-01-19 PROCEDURE — 71275 CT ANGIOGRAPHY CHEST: CPT | Mod: 26,MA

## 2022-01-19 PROCEDURE — 73502 X-RAY EXAM HIP UNI 2-3 VIEWS: CPT | Mod: 26,RT

## 2022-01-19 PROCEDURE — 72125 CT NECK SPINE W/O DYE: CPT | Mod: 26,MA

## 2022-01-19 RX ORDER — CARBIDOPA AND LEVODOPA 25; 100 MG/1; MG/1
1 TABLET ORAL ONCE
Refills: 0 | Status: COMPLETED | OUTPATIENT
Start: 2022-01-19 | End: 2022-01-19

## 2022-01-19 RX ORDER — ACETAMINOPHEN 500 MG
975 TABLET ORAL ONCE
Refills: 0 | Status: COMPLETED | OUTPATIENT
Start: 2022-01-19 | End: 2022-01-19

## 2022-01-19 RX ADMIN — Medication 975 MILLIGRAM(S): at 18:45

## 2022-01-19 RX ADMIN — CARBIDOPA AND LEVODOPA 1 TABLET(S): 25; 100 TABLET ORAL at 18:45

## 2022-01-19 NOTE — ED ADULT NURSE REASSESSMENT NOTE - NS ED NURSE REASSESS COMMENT FT1
Report received from [facility]. Pt A/O x3- identifies name, location, reason for being in hospital. Daughter states pt has history of parkinson's dz and occasionally forgets date. Pt able to move all four extremities- no signs of pain/ discomfort.

## 2022-01-19 NOTE — ED PROVIDER NOTE - ATTENDING CONTRIBUTION TO CARE
Agree with above except noted:     mechanical fall w.o ac meds, p.w left pelvic and thigh pain with large hematoma and questionable leaky aortic valve s/p TAVR, mild chest pain but no shortness of breath nontoxic appearing, NAD, beside the large hematoma of the left thigh, FROM of the LE edema. due to quick development of hematoma and severe pain will eval for active extrava and cta chest and ctap for dissection study. dispo pending labs/imaging/further workups. Agree with above except noted:     mechanical fall w.o ac meds, p.w left pelvic and thigh pain with large hematoma and questionable leaky aortic valve s/p TAVR, mild chest pain but no shortness of breath nontoxic appearing, NAD, beside the large hematoma of the left thigh, FROM of the LE edema. due to quick development of hematoma and severe pain will eval for active extrava and cta chest and ctap for dissection study. dispo pending labs/imaging/further workups. ct head for bleed but low suspicion. will symptomatic/pain control at this time. will get comprehensive labs to evaluate for hematologic abnormality, renal function, electrolyte derangement for possible symptom etiology.

## 2022-01-19 NOTE — CONSULT NOTE ADULT - SUBJECTIVE AND OBJECTIVE BOX
Interventional Radiology    HPI: 76y Female with   L hip pain s/p slip and fall, left thigh hematoma, softball sized per ED team, CTA ordered showing active extravasation within hematoma in the left thigh soft tissues    Allergies:   Medications (Abx/Cardiac/Anticoagulation/Blood Products)      Data:  142.2  36.3  T(C): 37.1  HR: 88  BP: 155/70  RR: 20  SpO2: 98%    -WBC 6.83 / HgB 13.3 / Hct 41.2 / Plt 260  -Na 135 / Cl 100 / BUN 19 / Glucose 96  -K 4.3 / CO2 21 / Cr 0.63  -ALT 12 / Alk Phos 64 / T.Bili 0.2    Radiology:     Assessment/Plan:   76y Female with   L hip pain s/p slip and fall, left thigh hematoma, softball sized per ED team, CTA ordered showing active extravasation within hematoma in the left thigh soft tissues      Hemodynamically stable. Initial hgb 13.3.  Hematoma within left lateral thigh subcutaneous fat, not at risk for vascular compression.      No plan for embolization at this time  continue trend cbc/vitals, transfuse prn per primary team.      if patient demonstrates acute decompensation/hemodynamic instability not responsive to fluid resuscitation, or if hemoglobin continues to drop significantly despite transfusion, please page IR at [phone] for re-evaluation for intervention.     Joao Whatley MD  Interventional Radiology PGY4    -EMERGENT: [facility] IR Pager: [phone].  [facility] IR Pager: [phone] [email or web address]  -Nonemergent consults:  [facility] order "Consult- Interventional Radiology"   -Available on [employer] TEAMS for questions   Interventional Radiology    HPI: 76y Female on ASA/Plavix with   L hip pain s/p slip and fall, left thigh hematoma, softball sized per ED team, CTA ordered showing active extravasation within hematoma in the left thigh soft tissues    Allergies:   Medications (Abx/Cardiac/Anticoagulation/Blood Products)      Data:  142.2  36.3  T(C): 37.1  HR: 88  BP: 155/70  RR: 20  SpO2: 98%    -WBC 6.83 / HgB 13.3 / Hct 41.2 / Plt 260  -Na 135 / Cl 100 / BUN 19 / Glucose 96  -K 4.3 / CO2 21 / Cr 0.63  -ALT 12 / Alk Phos 64 / T.Bili 0.2    Radiology:     Assessment/Plan:   76y Femaleon ASA/Plavix  with  L hip pain s/p slip and fall, left thigh hematoma, softball sized per ED team, CTA ordered showing active extravasation within hematoma in the left thigh soft tissues      Hemodynamically stable. Initial hgb 13.3.  Hematoma within left lateral thigh subcutaneous fat, not at risk for vascular compression.      No plan for embolization at this time  continue trend cbc/vitals, transfuse prn per primary team.      if patient demonstrates acute decompensation/hemodynamic instability not responsive to fluid resuscitation, or if hemoglobin continues to drop significantly despite transfusion, please page IR at [phone] for re-evaluation for intervention.     Joao Whatley MD  Interventional Radiology PGY4    -EMERGENT: [facility] IR Pager: [phone].  [facility] IR Pager: [phone] [id number]  -Nonemergent consults:  [facility] order "Consult- Interventional Radiology"   -Available on [employer] TEAMS for questions

## 2022-01-19 NOTE — ED PROVIDER NOTE - PROGRESS NOTE DETAILS
Mack Baker MD, Attending: radiology concerning for small active extravas and surgery aware and IR paged. Mack Baker MD, Attending: spoke with Dr. Cardenas and the surgery team and recommended CT surgery eval for extension of the dissection. CT surgery is aware. vital wnl. Mack Baker MD, Attending: spoke with CT surgery states the aortic root is stable and no surgical intervention at this time. updated the surgery team and will admit under Dr. Cardenas. Attending New:  sunrise down earlier, received pt in s/o was admitted but official order not in, now placed order

## 2022-01-19 NOTE — ED PROVIDER NOTE - OBJECTIVE STATEMENT
76F, h.o parkinson, dementia, CHF, aortic dissection s/p TAVR, no AC meds p.w mechanical fall today with left thigh pain and swelling. headache dull in nature, no vision changes, or syncope or new numbness/tingling, n/v or abdominal pain. CT head and xray are ordered by qdoc. 76F, h.o parkinson, dementia, CHF, aortic dissection s/p TEVAR no AC meds p.w mechanical fall today with left thigh pain and swelling. headache dull in nature, no vision changes, or syncope or new numbness/tingling, n/v or abdominal pain. CT head and xray are ordered by qdoc.   TEVAR was done 2 years ago by Dr. Jones at [facility]. 76F, h.o parkinson, dementia, CHF, aortic dissection s/p repair, no AC meds p.w mechanical fall today with left thigh pain and swelling. headache dull in nature, no vision changes, or syncope or new numbness/tingling, n/v or abdominal pain. CT head and xray are ordered by qdoc.   aorta repair was done 2 years ago by Dr. Jones at [facility].

## 2022-01-19 NOTE — ED PROVIDER NOTE - PHYSICAL EXAMINATION
General: NAD, good hygiene, well developed  HENT: Atraumatic, EOMI, no conjunctivae injection, moist mucosa.  Neck: normal ROM and trachea midline   Cardiovascular: RRR, S1&2, no M or R, radial pulses equal and b/l  Respiratory: CTABL, no wheezes or crackles, no decreased breath sounds  Abdominal: soft and non-tender non distended, neg for guarding, no CVA tenderness   Extremities: left thigh hematoma laterally size of the softball, no edema of the legs/feet, distal pulses equal and b/l, FROM of the hip and the LE edema.   Skin: warm, well perfused  Neuro: facial symmetry, CN2-12 intact, AOX3, EOMI. PERRLA, 5/5 strength in UE and LE b/l.  Sensation intact in UE/LE b/l. Neg for pronator drift, gait test is deferred.   Psych: normal mood and affect

## 2022-01-19 NOTE — ED PROVIDER NOTE - NS ED ROS FT
GENERAL: No fever or chills, weight changes, nightsweats  EYES: no change in vision  HEENT: no dysplasia, odynophagia, ear pain, rhinorrhea, epistasis   CARDIAC: no chest pain, palpitation   PULMONARY: no productive cough or SOB  GI: no abdominal pain, no nausea or no vomiting, no diarrhea or constipation  : No changes in urination for pain/freq.   SKIN: no rashes, abnormal bruising or bleeding  NEURO: no numbness/tingling, extremity weakness   MSK:  leg pain on the left,

## 2022-01-19 NOTE — ED ADULT NURSE NOTE - OBJECTIVE STATEMENT
76 /o female arrives to the ER complaining of hip pain. PMH of Parkinson, Dementia.  Pt is A&Ox4, speaking coherently. Pt states having headache and left hip pain after she slipped and fall this morning. Pt reports she was able to get up after and walk with aid of walker  Pt denies SOB, chest pain, dizziness, N/V/D, urinary symptoms, fevers, chills. On assessment airway is patent, breathing spontaneously and unlabored. Skin is dry, warm and color appropriate to race.  Abdomen is soft, no distended, no tender. Full ROM in all extremities.  Patient undressed and placed into gown, side rails up with bed locked and in lowest position for safety. call bell within reach. [place] provided. Comfort and safety provided. Educated not to ambulate without assistance. will continue to reassess. 76 /o female arrives to the ER complaining of hip pain. PMH of Parkinson, Dementia, CHF, PVD. Pt is A&Ox4, speaking coherently. Pt states having headache and left hip pain after she slipped and fall this morning Pt reports hitting her head, no LOC, no blood thinners use.  Pt reports she was able to get up after and walk with his walker, pt walks with a walker as baseline.  Patient is well appearing, airway is patent, breathing spontaneously and unlabored. skin warm and intact, PERRL, EOM intact, sensory intact, equal strength b/l in all upper and lower extremities. Abdomen is soft, no distended, no tender. Full ROM in all extremities. Pt denies SOB, chest pain, dizziness, N/V/D, urinary symptoms, fevers, chills. Patient undressed and placed into gown, side rails up with bed locked and in lowest position for safety. call bell within reach. [place] provided. Comfort and safety provided. Educated not to ambulate without assistance. will continue to reassess. 76 /o female arrives to the ER complaining of hip pain. PMH of Parkinson, Dementia, CHF, PVD. Pt is A&Ox4, speaking coherently. Pt states having worsening headache and L side left hip pain after she slipped and fall this morning. Pt reports she is being having headache for the last few days, getting worse after the fall. Pt reports hitting her head, no LOC, no blood thinners use. Pt reports she was able to get up after and walk with his walker, pt walks with a walker as baseline. at arrival pt present with a hematoma on the L side of the hip.  Patient is well appearing, airway is patent, breathing spontaneously and unlabored. skin warm and intact, PERRL, EOM intact, sensory intact, equal strength b/l in all upper and lower extremities. Abdomen is soft, no distended, no tender. Full ROM in all extremities. Pt denies SOB, chest pain, dizziness, N/V/D, urinary symptoms, fevers, chills. Patient undressed and placed into gown, side rails up with bed locked and in lowest position for safety. call bell within reach. [place] provided. Comfort and safety provided. Educated not to ambulate without assistance. will continue to reassess.

## 2022-01-19 NOTE — ED PROVIDER NOTE - RAPID ASSESSMENT
76 F with PMHX of Parkinson's disease with dementia, AVR, CHF, PVD presents with HA, and L hip pain s/p slip and fall. +Head strike. Pt was able to get up by herself. Pt ambulates with walker at baseline. Denies AC use or LOC. Hx obtained by daughter. Of note, pt has been endorsing HA for the past couple of weeks. Also notes a "bulge" in L hip per daughter. Pt is well appearing.     Scribe Statement: I, Anny Wolfe, attest that this documentation has been prepared under the direction and in the presence of Marcello Cason) 76 F with PMHX of Parkinson's disease with dementia, AVR, CHF, PVD presents with HA, and L hip pain s/p slip and fall. +Head strike. Pt was able to get up by herself. Pt ambulates with walker at baseline. Denies AC use or LOC. Hx obtained by daughter. Of note, pt has been endorsing HA for the past couple of weeks. Also notes a "bulge" in L hip per daughter. Pt is well appearing.     Scribe Statement: I, Anny Wolfe, attest that this documentation has been prepared under the direction and in the presence of Marcello Cason)    Yoav ELIZALDE: Patient was seen using tele-video conferencing.  A brief medical screening was performed.  Initial labs/orders were entered based on information provided at the time of the patient's evaluation.  Care is to be resumed by the primary ED team.  The scribe's documentation has been prepared under my direction and personally reviewed by me in its entirety. I confirm that the note above accurately reflects all work, treatment, procedures, and medical decision making performed by me (Dr. Cason).

## 2022-01-19 NOTE — ED ADULT TRIAGE NOTE - CHIEF COMPLAINT QUOTE
headache and left hip pain s/p slipped and fall this morning, able to get up after and walk with aid of walker

## 2022-01-20 ENCOUNTER — INPATIENT (INPATIENT)
Facility: HOSPITAL | Age: 77
LOS: 0 days | Discharge: HOME | DRG: 605 | End: 2022-01-21
Attending: SURGERY | Admitting: SURGERY
Payer: MEDICARE

## 2022-01-20 DIAGNOSIS — S80.12XA CONTUSION OF LEFT LOWER LEG, INITIAL ENCOUNTER: ICD-10-CM

## 2022-01-20 DIAGNOSIS — Z98.890 OTHER SPECIFIED POSTPROCEDURAL STATES: Chronic | ICD-10-CM
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
## 2022-01-20 DIAGNOSIS — Z98.890 OTHER SPECIFIED POSTPROCEDURAL STATES: ICD-10-CM

## 2022-01-20 LAB
CULTURE RESULTS: SIGNIFICANT CHANGE UP
HCT VFR BLD CALC: 37.2 % — SIGNIFICANT CHANGE UP (ref 34.5–45)
HGB BLD-MCNC: 12.1 G/DL — SIGNIFICANT CHANGE UP (ref 11.5–15.5)
MCHC RBC-ENTMCNC: 28.7 PG — SIGNIFICANT CHANGE UP (ref 27–34)
MCHC RBC-ENTMCNC: 32.5 GM/DL — SIGNIFICANT CHANGE UP (ref 32–36)
MCV RBC AUTO: 88.4 FL — SIGNIFICANT CHANGE UP (ref 80–100)
NRBC # BLD: 0 /100 WBCS — SIGNIFICANT CHANGE UP (ref 0–0)
PLATELET # BLD AUTO: 229 K/UL — SIGNIFICANT CHANGE UP (ref 150–400)
RBC # BLD: 4.21 M/UL — SIGNIFICANT CHANGE UP (ref 3.8–5.2)
RBC # FLD: 13.1 % — SIGNIFICANT CHANGE UP (ref 10.3–14.5)
SPECIMEN SOURCE: SIGNIFICANT CHANGE UP
WBC # BLD: 6.61 K/UL — SIGNIFICANT CHANGE UP (ref 3.8–10.5)
WBC # FLD AUTO: 6.61 K/UL — SIGNIFICANT CHANGE UP (ref 3.8–10.5)

## 2022-01-20 RX ORDER — DRONABINOL 2.5 MG
2.5 CAPSULE ORAL
Refills: 0 | Status: DISCONTINUED | OUTPATIENT
Start: 2022-01-20 | End: 2022-01-21

## 2022-01-20 RX ORDER — SENNA PLUS 8.6 MG/1
2 TABLET ORAL AT BEDTIME
Refills: 0 | Status: DISCONTINUED | OUTPATIENT
Start: 2022-01-20 | End: 2022-01-21

## 2022-01-20 RX ORDER — SENNA PLUS 8.6 MG/1
1 TABLET ORAL
Qty: 0 | Refills: 0 | DISCHARGE

## 2022-01-20 RX ORDER — ASCORBIC ACID 60 MG
1 TABLET,CHEWABLE ORAL
Qty: 0 | Refills: 0 | DISCHARGE

## 2022-01-20 RX ORDER — CLOPIDOGREL BISULFATE 75 MG/1
1 TABLET, FILM COATED ORAL
Qty: 0 | Refills: 0 | DISCHARGE

## 2022-01-20 RX ORDER — AMLODIPINE BESYLATE 2.5 MG/1
1 TABLET ORAL
Qty: 0 | Refills: 0 | DISCHARGE

## 2022-01-20 RX ORDER — DRONABINOL 2.5 MG
1 CAPSULE ORAL
Qty: 0 | Refills: 0 | DISCHARGE

## 2022-01-20 RX ORDER — ACETAMINOPHEN 500 MG
650 TABLET ORAL EVERY 6 HOURS
Refills: 0 | Status: DISCONTINUED | OUTPATIENT
Start: 2022-01-20 | End: 2022-01-21

## 2022-01-20 RX ORDER — ASPIRIN/CALCIUM CARB/MAGNESIUM 324 MG
1 TABLET ORAL
Qty: 0 | Refills: 0 | DISCHARGE

## 2022-01-20 RX ORDER — CARBIDOPA AND LEVODOPA 25; 100 MG/1; MG/1
1 TABLET ORAL
Qty: 0 | Refills: 0 | DISCHARGE

## 2022-01-20 RX ORDER — PANTOPRAZOLE SODIUM 20 MG/1
1 TABLET, DELAYED RELEASE ORAL
Qty: 0 | Refills: 0 | DISCHARGE

## 2022-01-20 RX ORDER — PANTOPRAZOLE SODIUM 20 MG/1
40 TABLET, DELAYED RELEASE ORAL
Refills: 0 | Status: DISCONTINUED | OUTPATIENT
Start: 2022-01-20 | End: 2022-01-21

## 2022-01-20 RX ORDER — CARBIDOPA AND LEVODOPA 25; 100 MG/1; MG/1
1 TABLET ORAL THREE TIMES A DAY
Refills: 0 | Status: DISCONTINUED | OUTPATIENT
Start: 2022-01-20 | End: 2022-01-21

## 2022-01-20 RX ORDER — ZINC SULFATE TAB 220 MG (50 MG ZINC EQUIVALENT) 220 (50 ZN) MG
1 TAB ORAL
Qty: 0 | Refills: 0 | DISCHARGE

## 2022-01-20 RX ADMIN — CARBIDOPA AND LEVODOPA 1 TABLET(S): 25; 100 TABLET ORAL at 05:51

## 2022-01-20 RX ADMIN — CARBIDOPA AND LEVODOPA 1 TABLET(S): 25; 100 TABLET ORAL at 13:23

## 2022-01-20 RX ADMIN — Medication 1 TABLET(S): at 12:31

## 2022-01-20 RX ADMIN — Medication 650 MILLIGRAM(S): at 05:50

## 2022-01-20 RX ADMIN — SENNA PLUS 2 TABLET(S): 8.6 TABLET ORAL at 21:13

## 2022-01-20 RX ADMIN — Medication 650 MILLIGRAM(S): at 12:31

## 2022-01-20 RX ADMIN — Medication 650 MILLIGRAM(S): at 18:16

## 2022-01-20 RX ADMIN — Medication 2.5 MILLIGRAM(S): at 05:51

## 2022-01-20 RX ADMIN — Medication 650 MILLIGRAM(S): at 07:40

## 2022-01-20 RX ADMIN — CARBIDOPA AND LEVODOPA 1 TABLET(S): 25; 100 TABLET ORAL at 21:13

## 2022-01-20 RX ADMIN — PANTOPRAZOLE SODIUM 40 MILLIGRAM(S): 20 TABLET, DELAYED RELEASE ORAL at 05:52

## 2022-01-20 NOTE — H&P ADULT - NSHPPHYSICALEXAM_GEN_ALL_CORE
Objective:   Vital Signs Last 24 Hrs  T(C): 36.5 (19 Jan 2022 23:36), Max: 37.1 (19 Jan 2022 19:16)  T(F): 97.7 (19 Jan 2022 23:36), Max: 98.7 (19 Jan 2022 19:16)  HR: 78 (19 Jan 2022 23:36) (78 - 90)  BP: 131/66 (19 Jan 2022 23:36) (127/79 - 155/70)  BP(mean): --  RR: 20 (19 Jan 2022 23:36) (20 - 20)  SpO2: 99% (19 Jan 2022 23:36) (98% - 99%)  CAPILLARY BLOOD GLUCOSE          Physical Exam:  General: NAD, resting comfortably   CV: regular rate and rhythm   Pulm: no increased work of breathing   Abdomen: soft, nontender, nondistended, no rebound or guarding    Extremities: warm and well perfused, left thigh with hematoma TTP, no skin changes

## 2022-01-20 NOTE — CONSULT NOTE ADULT - ASSESSMENT
76F, h.o parkinson, dementia, HTN, CHF, Type A aortic dissection s/p repair in 2019 at [facility] by Dr. Robert Shen, no AC meds p.w mechanical fall today. CTA of chest/abdomen demonstrated: "Type A dissection, status post repair, with extension of the dissection superiorly into the right brachiocephalic artery and inferiorly into the thoracic aorta and abdominal aorta, with termination the flap at the bifurcation of the common iliac arteries." There is no active extravasation from repair. CT surgery was consulted for CTA findings.

## 2022-01-20 NOTE — H&P ADULT - NSHPLABSRESULTS_GEN_ALL_CORE
LABS:                        13.2   7.03  )-----------( 247      ( 2022 23:16 )             39.5         135  |  100  |  19  ----------------------------<  96  4.3   |  21<L>  |  0.63    Ca    9.4      2022 16:41    TPro  7.2  /  Alb  4.2  /  TBili  0.2  /  DBili  x   /  AST  23  /  ALT  12  /  AlkPhos  64      PT/INR - ( 2022 23:16 )   PT: 11.7 sec;   INR: 0.97 ratio         PTT - ( 2022 23:16 )  PTT:29.8 sec  LIVER FUNCTIONS - ( 2022 16:41 )  Alb: 4.2 g/dL / Pro: 7.2 g/dL / ALK PHOS: 64 U/L / ALT: 12 U/L / AST: 23 U/L / GGT: x           Urinalysis Basic - ( 2022 18:59 )    Color: Light Yellow / Appearance: Clear / S.019 / pH: x  Gluc: x / Ketone: Negative  / Bili: Negative / Urobili: Negative   Blood: x / Protein: 30 mg/dL / Nitrite: Negative   Leuk Esterase: Negative / RBC: 1 /hpf / WBC 1 /HPF   Sq Epi: x / Non Sq Epi: 3 /hpf / Bacteria: Negative              RADIOLOGY & ADDITIONAL STUDIES:  PACS Image: Image(s) Available (22 @ 21:24)  PACS Image: Image(s) Available (22 @ 21:24)  PACS Image: Image(s) Available (22 @ 18:37)  PACS Image: Image(s) Available (22 @ 18:37)  PACS Image: Image(s) Available (22 @ 17:57)  PACS Image: Image(s) Available (22 @ 17:57)    < from: CT Head No Cont (22 @ 17:57) >      IMPRESSION:    Head CT: No displaced calvarial fracture or acute intracranial hemorrhage.    Cervical spine CT: No acute fracture or acute traumatic malalignment.    C< from: CT Angio Chest Aorta w/wo IV Cont (22 @ 21:24) >      IMPRESSION:  *  Small hematoma within the soft tissues of the left lateral thigh with   active extravasation.  *  Type A dissection, status post repair, with extension of the   dissection superiorly into the right brachiocephalic artery and   inferiorly into the thoracic aorta and abdominal aorta, with termination   the flap at the bifurcation of the common iliac arteries.

## 2022-01-20 NOTE — CONSULT NOTE ADULT - SUBJECTIVE AND OBJECTIVE BOX
History of Present Illness:  76F, h.o parkinson, dementia, HTN, CHF, Type A aortic dissection s/p repair in 2019 at [facility] by Dr. Robert Shen, no  meds p.w mechanical fall today. Daughter at bedside to assist with history, states she initially helped patient to bed. Noticed a few minutes later that her mother was out of bed, and told her she fell. Fall was unwitnessed. Complaining of pain only in left thigh. States has mild intermittent headaches; however this is prior to fall, no vision changes, or syncope or new numbness/tingling, n/v or abdominal pain. Denies fevers, chills, chest pain, sOB, abd. pain, n/v/, weight loss or recent travel.     CT Surgery consulted for findings on CTA.    Patient seen and examined in ED. Patient looks comfortable. Denies chest pain, SOB, N/V, palpitations, paresthesias, weakness.    Imaging reviewed. CTA demonstrated: "Type A dissection, status post repair, with extension of the dissection superiorly into the right brachiocephalic artery and inferiorly into the thoracic aorta and abdominal aorta, with termination the flap at the bifurcation of the common iliac arteries." There is no active extravasation from repair.    Past Medical History:  Parkinson disease    Type A Aortic Dissection    HTN    Past Surgical History:    H/O aortic arch repair        MEDICATIONS  (STANDING):  acetaminophen     Tablet .. 650 milliGRAM(s) Oral every 6 hours  carbidopa/levodopa  25/100 1 Tablet(s) Oral three times a day  dronabinol 2.5 milliGRAM(s) Oral two times a day  multivitamin 1 Tablet(s) Oral daily  pantoprazole    Tablet 40 milliGRAM(s) Oral before breakfast  senna 2 Tablet(s) Oral at bedtime    Allergies: No Known Allergies      General: Denies fever, chills  Neuro: Denies headache, paresthesias, dizziness  Eyes: Denies blurry vision, diplopia  CV: Denies chest pain, palpitations  Resp: Denies SOB, cough  : Denies dysuria, hematuria  GI: Denies abdominal pain, N/V  Heme: Denies weakness, tiredness  MSK: Denies joint pain  Psych: Denies anxiety, depression, mohsen  Vasc: Denies leg pain, claudication                                                                        Vital Signs Last 24 Hrs  T(C): 36.3 (2022 01:55), Max: 37.1 (2022 19:16)  T(F): 97.4 (2022 01:55), Max: 98.7 (2022 19:16)  HR: 78 (2022 01:55) (78 - 90)  BP: 137/70 (2022 01:55) (127/79 - 155/70)  BP(mean): --  RR: 18 (2022 01:55) (18 - 20)  SpO2: 98% (2022 01:55) (98% - 99%)    Physical Exam:  General: Pleasant appearing in NAD.  Neuro: AAO x4, no deficits noted. Strength equal b/l UE and LE.  Skin: Mild ecchymosis of left lateral portion of orbit and left forehead. No cyanosis or erythema.  Head: NCAT.  Eyes: Pupils equal and reactive.  Neck: No JVD.  CV: S1S2, regular rhythm.  Pulm: CTABL, no wheezing, rhonchi  GI: Abd soft, NT, ND, positive BS throughout.  Vasc: Left lateral thigh/hip hematoma, tender to palpation. B/L radial and DP pulses palpable and equal. Warm and well-perfused.  MSK: Equal ROM throughout except for some limited ROM of LLE due to hematoma.                                                          LABS:                        13.2   7.03  )-----------( 247      ( 2022 23:16 )             39.5         135  |  100  |  19  ----------------------------<  96  4.3   |  21<L>  |  0.63    Ca    9.4      2022 16:41    TPro  7.2  /  Alb  4.2  /  TBili  0.2  /  DBili  x   /  AST  23  /  ALT  12  /  AlkPhos  64      PT/INR - ( 2022 23:16 )   PT: 11.7 sec;   INR: 0.97 ratio         PTT - ( 2022 23:16 )  PTT:29.8 sec  Urinalysis Basic - ( 2022 18:59 )    Color: Light Yellow / Appearance: Clear / S.019 / pH: x  Gluc: x / Ketone: Negative  / Bili: Negative / Urobili: Negative   Blood: x / Protein: 30 mg/dL / Nitrite: Negative   Leuk Esterase: Negative / RBC: 1 /hpf / WBC 1 /HPF   Sq Epi: x / Non Sq Epi: 3 /hpf / Bacteria: Negative    < from: CT Head No Cont (22 @ 17:57) >      IMPRESSION:    Head CT: No displaced calvarial fracture or acute intracranial hemorrhage.    Cervical spine CT: No acute fracture or acute traumatic malalignment.    C< from: CT Angio Chest Aorta w/wo IV Cont (22 @ 21:24) >      IMPRESSION:  *  Small hematoma within the soft tissues of the left lateral thigh with   active extravasation.  *  Type A dissection, status post repair, with extension of the   dissection superiorly into the right brachiocephalic artery and   inferiorly into the thoracic aorta and abdominal aorta, with termination   the flap at the bifurcation of the common iliac arteries.

## 2022-01-20 NOTE — H&P ADULT - HISTORY OF PRESENT ILLNESS
76F, h.o parkinson, dementia, CHF, aortic dissection s/p repair, no AC meds p.w mechanical fall today. Daughter at bedside to assist with history, states she initially helped patient to bed. Noticed a few minutes later that her mother was out of bed, and told her she fell. Fall was unwitnessed. Complaining of pain only in left thigh. States has mild intermittent headaches; however this is prior to fall, no vision changes, or syncope or new numbness/tingling, n/v or abdominal pain. Denies fevers, chills, chest pain, sOB, abd. pain, n/v/, weight loss or recent travel.   aorta repair was done 2 years ago by Dr. Jones at [facility].

## 2022-01-20 NOTE — PHYSICAL THERAPY INITIAL EVALUATION ADULT - PERTINENT HX OF CURRENT PROBLEM, REHAB EVAL
76F, h.o parkinson, dementia, CHF, aortic dissection s/p repair, no AC meds p.w mechanical fall today. Daughter at bedside to assist with history, states she initially helped patient to bed. Noticed a few minutes later that her mother was out of bed, and told her she fell. Fall was unwitnessed. Complaining of pain only in left thigh.  pt  found to have left thigh hematoma with active extravasation

## 2022-01-20 NOTE — H&P ADULT - ASSESSMENT
77y/o presenting s/p fall found to have left thigh hematoma with active extravasation     - no acute intervention at this time   -admit for observation   - monitor CBC   - hold DVT ppx   -reg diet   - f/u CT surg regarding dissection     Patient seen and examined with Dr. Cardenas

## 2022-01-20 NOTE — CONSULT NOTE ADULT - CONSULT REASON
thigh hematoma
History of Type A Aortic Dissection s/p Repair, CT findings of extension of dissection

## 2022-01-20 NOTE — CONSULT NOTE ADULT - PROBLEM SELECTOR RECOMMENDATION 9
*Discussed with Dr. Esquivel and images reviewed*  - No intervention necessary  - BP control as per primary team  - Will sign off, please reconsult as needed  - Discussed with General Surgery and ED attending  - Family updated

## 2022-01-20 NOTE — PATIENT PROFILE ADULT - FALL HARM RISK - HARM RISK INTERVENTIONS

## 2022-01-21 ENCOUNTER — TRANSCRIPTION ENCOUNTER (OUTPATIENT)
Age: 77
End: 2022-01-21

## 2022-01-21 VITALS
DIASTOLIC BLOOD PRESSURE: 63 MMHG | HEART RATE: 78 BPM | SYSTOLIC BLOOD PRESSURE: 105 MMHG | TEMPERATURE: 99 F | OXYGEN SATURATION: 100 % | RESPIRATION RATE: 18 BRPM

## 2022-01-21 LAB
HCT VFR BLD CALC: 34.9 % — SIGNIFICANT CHANGE UP (ref 34.5–45)
HCT VFR BLD CALC: 36.4 % — SIGNIFICANT CHANGE UP (ref 34.5–45)
HGB BLD-MCNC: 11.4 G/DL — LOW (ref 11.5–15.5)
HGB BLD-MCNC: 11.9 G/DL — SIGNIFICANT CHANGE UP (ref 11.5–15.5)
MCHC RBC-ENTMCNC: 28.4 PG — SIGNIFICANT CHANGE UP (ref 27–34)
MCHC RBC-ENTMCNC: 28.7 PG — SIGNIFICANT CHANGE UP (ref 27–34)
MCHC RBC-ENTMCNC: 32.7 GM/DL — SIGNIFICANT CHANGE UP (ref 32–36)
MCHC RBC-ENTMCNC: 32.7 GM/DL — SIGNIFICANT CHANGE UP (ref 32–36)
MCV RBC AUTO: 87 FL — SIGNIFICANT CHANGE UP (ref 80–100)
MCV RBC AUTO: 87.9 FL — SIGNIFICANT CHANGE UP (ref 80–100)
NRBC # BLD: 0 /100 WBCS — SIGNIFICANT CHANGE UP (ref 0–0)
NRBC # BLD: 0 /100 WBCS — SIGNIFICANT CHANGE UP (ref 0–0)
PLATELET # BLD AUTO: 244 K/UL — SIGNIFICANT CHANGE UP (ref 150–400)
PLATELET # BLD AUTO: 261 K/UL — SIGNIFICANT CHANGE UP (ref 150–400)
RBC # BLD: 4.01 M/UL — SIGNIFICANT CHANGE UP (ref 3.8–5.2)
RBC # BLD: 4.14 M/UL — SIGNIFICANT CHANGE UP (ref 3.8–5.2)
RBC # FLD: 13.1 % — SIGNIFICANT CHANGE UP (ref 10.3–14.5)
RBC # FLD: 13.2 % — SIGNIFICANT CHANGE UP (ref 10.3–14.5)
WBC # BLD: 6.3 K/UL — SIGNIFICANT CHANGE UP (ref 3.8–10.5)
WBC # BLD: 6.96 K/UL — SIGNIFICANT CHANGE UP (ref 3.8–10.5)
WBC # FLD AUTO: 6.3 K/UL — SIGNIFICANT CHANGE UP (ref 3.8–10.5)
WBC # FLD AUTO: 6.96 K/UL — SIGNIFICANT CHANGE UP (ref 3.8–10.5)

## 2022-01-21 PROCEDURE — 71045 X-RAY EXAM CHEST 1 VIEW: CPT

## 2022-01-21 PROCEDURE — 86850 RBC ANTIBODY SCREEN: CPT

## 2022-01-21 PROCEDURE — 73502 X-RAY EXAM HIP UNI 2-3 VIEWS: CPT

## 2022-01-21 PROCEDURE — 87086 URINE CULTURE/COLONY COUNT: CPT

## 2022-01-21 PROCEDURE — 71275 CT ANGIOGRAPHY CHEST: CPT | Mod: MA

## 2022-01-21 PROCEDURE — 81001 URINALYSIS AUTO W/SCOPE: CPT

## 2022-01-21 PROCEDURE — 36415 COLL VENOUS BLD VENIPUNCTURE: CPT

## 2022-01-21 PROCEDURE — 85027 COMPLETE CBC AUTOMATED: CPT

## 2022-01-21 PROCEDURE — 84100 ASSAY OF PHOSPHORUS: CPT

## 2022-01-21 PROCEDURE — 86901 BLOOD TYPING SEROLOGIC RH(D): CPT

## 2022-01-21 PROCEDURE — 83735 ASSAY OF MAGNESIUM: CPT

## 2022-01-21 PROCEDURE — 74174 CTA ABD&PLVS W/CONTRAST: CPT | Mod: MA

## 2022-01-21 PROCEDURE — 72125 CT NECK SPINE W/O DYE: CPT | Mod: MA

## 2022-01-21 PROCEDURE — 85730 THROMBOPLASTIN TIME PARTIAL: CPT

## 2022-01-21 PROCEDURE — 80053 COMPREHEN METABOLIC PANEL: CPT

## 2022-01-21 PROCEDURE — 86900 BLOOD TYPING SEROLOGIC ABO: CPT

## 2022-01-21 PROCEDURE — 80048 BASIC METABOLIC PNL TOTAL CA: CPT

## 2022-01-21 PROCEDURE — 97161 PT EVAL LOW COMPLEX 20 MIN: CPT

## 2022-01-21 PROCEDURE — 70450 CT HEAD/BRAIN W/O DYE: CPT | Mod: MA

## 2022-01-21 PROCEDURE — 87635 SARS-COV-2 COVID-19 AMP PRB: CPT

## 2022-01-21 PROCEDURE — 99285 EMERGENCY DEPT VISIT HI MDM: CPT

## 2022-01-21 PROCEDURE — 85610 PROTHROMBIN TIME: CPT

## 2022-01-21 RX ORDER — ACETAMINOPHEN 500 MG
1 TABLET ORAL
Qty: 0 | Refills: 0 | DISCHARGE

## 2022-01-21 RX ADMIN — Medication 2.5 MILLIGRAM(S): at 05:41

## 2022-01-21 RX ADMIN — Medication 1 TABLET(S): at 12:13

## 2022-01-21 RX ADMIN — CARBIDOPA AND LEVODOPA 1 TABLET(S): 25; 100 TABLET ORAL at 13:35

## 2022-01-21 RX ADMIN — Medication 650 MILLIGRAM(S): at 05:40

## 2022-01-21 RX ADMIN — Medication 650 MILLIGRAM(S): at 12:40

## 2022-01-21 RX ADMIN — Medication 650 MILLIGRAM(S): at 12:13

## 2022-01-21 RX ADMIN — Medication 650 MILLIGRAM(S): at 00:26

## 2022-01-21 RX ADMIN — Medication 650 MILLIGRAM(S): at 00:56

## 2022-01-21 RX ADMIN — Medication 650 MILLIGRAM(S): at 06:10

## 2022-01-21 RX ADMIN — CARBIDOPA AND LEVODOPA 1 TABLET(S): 25; 100 TABLET ORAL at 05:41

## 2022-01-21 RX ADMIN — PANTOPRAZOLE SODIUM 40 MILLIGRAM(S): 20 TABLET, DELAYED RELEASE ORAL at 05:41

## 2022-01-21 NOTE — DISCHARGE NOTE PROVIDER - NSDCCPCAREPLAN_GEN_ALL_CORE_FT
PRINCIPAL DISCHARGE DIAGNOSIS  Diagnosis: Leg hematoma, left, initial encounter  Assessment and Plan of Treatment: ambulate as tolerated  Please follow up with your Trauma Doctor only if needed at [address] [facility] [address] [place] [id number], phone #[phone]  Please follow up with your regular medical doctor in 1 week, please call to schedule an appointment to discuss recent hospitalization        SECONDARY DISCHARGE DIAGNOSES  Diagnosis: S/P aortic dissection repair  Assessment and Plan of Treatment: follow up with your cardiac surgeon Dr. Jones in 1-2 weeks

## 2022-01-21 NOTE — DISCHARGE NOTE NURSING/CASE MANAGEMENT/SOCIAL WORK - NSDCPETBCESMAN_GEN_ALL_CORE
Patient If you are a smoker, it is important for your health to stop smoking. Please be aware that second hand smoke is also harmful.

## 2022-01-21 NOTE — DISCHARGE NOTE PROVIDER - HOSPITAL COURSE
76F, h.o parkinson, dementia, CHF, aortic dissection s/p repair, no AC meds p.w mechanical fall today. Daughter at bedside to assist with history, states she initially helped patient to bed. Noticed a few minutes later that her mother was out of bed, and told her she fell. Fall was unwitnessed. Complaining of pain only in left thigh. States has mild intermittent headaches; however this is prior to fall, no vision changes, or syncope or new numbness/tingling, n/v or abdominal pain. Denies fevers, chills, chest pain, sOB, abd. pain, n/v/, weight loss or recent travel.   aorta repair was done 2 years ago by Dr. Jones at [facility].    CTA c/a/p Small hematoma within the soft tissues of the left lateral thigh with  active extravasation. Type A dissection, status post repair, with extension of the dissection superiorly into the right brachiocephalic artery and inferiorly into the thoracic aorta and abdominal aorta, with termination  he flap at the bifurcation of the common iliac arteries.    Head CT: No displaced calvarial fracture or acute intracranial hemorrhage.    Cervical spine CT: No acute fracture or acute traumatic malalignment.    IR consulted- for active extrav- no embolization- patient hemodynamically stable- trend cbc/vitals    CT sx consulted - no intervention necessary     patient admitted for observation- CBC stable, vitals stable   Seen by PT- home PT     patient discharged home

## 2022-01-21 NOTE — PROGRESS NOTE ADULT - ASSESSMENT
75y/o presenting s/p fall found to have left thigh hematoma with active extravasation    PLAN:   - F/u AM CBC  - Hold DVT ppx   - Reg diet   - F/u CT surg regarding dissection - no intervention necessary     ATP / Trauma  [id number] 77y/o presenting s/p fall found to have left thigh hematoma with active extravasation    PLAN:   - F/u AM CBC  - Hold DVT ppx   - Reg diet   - F/u CT surg regarding dissection - no intervention necessary   - PT consult  - No NSAID  - Psych consult appreciated    ATP / Trauma  [id number] 77y/o presenting s/p fall found to have left thigh hematoma with active extravasation    PLAN:   - F/u AM CBC  - Hold DVT ppx   - Reg diet   - F/u CT surg regarding dissection - no intervention necessary   - PT consult  - No NSAID      ATP / Trauma  [id number]

## 2022-01-21 NOTE — DISCHARGE NOTE NURSING/CASE MANAGEMENT/SOCIAL WORK - PATIENT PORTAL LINK FT
You can access the FollowMyHealth Patient Portal offered by [facility] by registering at the following website: [email or web address]. By joining [employer]’s FollowMyHealth portal, you will also be able to view your health information using other applications (apps) compatible with our system.

## 2022-01-21 NOTE — DISCHARGE NOTE NURSING/CASE MANAGEMENT/SOCIAL WORK - NSDCPEFALRISK_GEN_ALL_CORE
For information on Fall & Injury Prevention, visit: [email or web address] OR  [email or web address] OR  https://www.cdc.gov/steadi/patient.html

## 2022-01-21 NOTE — DISCHARGE NOTE PROVIDER - NSDCMRMEDTOKEN_GEN_ALL_CORE_FT
amLODIPine 10 mg oral tablet: 1 tab(s) orally once a day  carbidopa-levodopa 25 mg-100 mg oral tablet: 1 tab(s) orally 3 times a day. 9 AM, 1 PM, 5 PM  dronabinol 2.5 mg oral capsule: 1 cap(s) orally 2 times a day  Multiple Vitamins oral tablet: 1 tab(s) orally once a day  Protonix 40 mg oral delayed release tablet: 1 tab(s) orally once a day  Senna 8.6 mg oral tablet: 1 tab(s) orally once a day (at bedtime), As Needed Constipation  Tylenol 500 mg oral tablet: 1 tab(s) orally every 6 hours, As Needed   Vitamin C 500 mg oral tablet: 1 tab(s) orally once a day  zinc oxide 20% topical ointment: 1 application topically 2 times a day  zinc sulfate 220 mg oral tablet: 1 tab(s) orally once a day

## 2022-01-21 NOTE — DISCHARGE NOTE PROVIDER - CARE PROVIDER_API CALL
Marlene Williamson (MD)  Surgery; Surgical Critical Care  [address] [address]  Phone: [phone]  Fax: [phone]  Follow Up Time: Routine

## 2022-01-21 NOTE — PROGRESS NOTE ADULT - SUBJECTIVE AND OBJECTIVE BOX
SURGERY  Pager: [phone]    INTERVAL EVENTS/SUBJECTIVE: No acute events overnight. Repeat PM CBC with h/h 12.1/37.2 to 11.4/34.9    ______________________________________________  OBJECTIVE:   T(C): 36.7 (01-21-22 @ 00:30), Max: 37.3 (01-20-22 @ 18:52)  HR: 80 (01-21-22 @ 00:30) (74 - 106)  BP: 116/66 (01-21-22 @ 00:30) (116/66 - 168/85)  RR: 18 (01-21-22 @ 00:30) (18 - 18)  SpO2: 99% (01-21-22 @ 00:30) (96% - 100%)  Wt(kg): --  CAPILLARY BLOOD GLUCOSE        I&O's Detail    20 Jan 2022 07:01  -  21 Jan 2022 01:55  --------------------------------------------------------  IN:  Total IN: 0 mL    OUT:    Voided (mL): 350 mL  Total OUT: 350 mL    Total NET: -350 mL          Physical Exam:  General: NAD, resting comfortably   Pulm: no increased work of breathing   Abdomen: soft, nontender, nondistended, no rebound or guarding    Extremities: warm and well perfused, left thigh with hematoma TTP, no skin changes  ______________________________________________  LABS:  CBC Full  -  ( 21 Jan 2022 00:18 )  WBC Count : 6.96 K/uL  RBC Count : 4.01 M/uL  Hemoglobin : 11.4 g/dL  Hematocrit : 34.9 %  Platelet Count - Automated : 244 K/uL  Mean Cell Volume : 87.0 fl  Mean Cell Hemoglobin : 28.4 pg  Mean Cell Hemoglobin Concentration : 32.7 gm/dL  Auto Neutrophil # : x  Auto Lymphocyte # : x  Auto Monocyte # : x  Auto Eosinophil # : x  Auto Basophil # : x  Auto Neutrophil % : x  Auto Lymphocyte % : x  Auto Monocyte % : x  Auto Eosinophil % : x  Auto Basophil % : x    01-19    135  |  100  |  19  ----------------------------<  96  4.3   |  21<L>  |  0.63    Ca    9.4      19 Jan 2022 16:41    TPro  7.2  /  Alb  4.2  /  TBili  0.2  /  DBili  x   /  AST  23  /  ALT  12  /  AlkPhos  64  01-19    _____________________________________________  RADIOLOGY:     SURGERY  Pager: [phone]    INTERVAL EVENTS/: No acute events overnight. Repeat PM CBC with h/h 12.1/37.2 to 11.4/34.9  SUBJECTIVE: Patient seen and examined at bedside this AM. Reports no pain, nausea or vomiting.     ______________________________________________  OBJECTIVE:   T(C): 36.7 (01-21-22 @ 00:30), Max: 37.3 (01-20-22 @ 18:52)  HR: 80 (01-21-22 @ 00:30) (74 - 106)  BP: 116/66 (01-21-22 @ 00:30) (116/66 - 168/85)  RR: 18 (01-21-22 @ 00:30) (18 - 18)  SpO2: 99% (01-21-22 @ 00:30) (96% - 100%)  Wt(kg): --  CAPILLARY BLOOD GLUCOSE        I&O's Detail    20 Jan 2022 07:01  -  21 Jan 2022 01:55  --------------------------------------------------------  IN:  Total IN: 0 mL    OUT:    Voided (mL): 350 mL  Total OUT: 350 mL    Total NET: -350 mL          Physical Exam:  General: NAD, resting comfortably   Pulm: no increased work of breathing   Abdomen: soft, nontender, nondistended, no rebound or guarding    Extremities: warm and well perfused, left thigh with hematoma TTP, no skin changes  ______________________________________________  LABS:  CBC Full  -  ( 21 Jan 2022 00:18 )  WBC Count : 6.96 K/uL  RBC Count : 4.01 M/uL  Hemoglobin : 11.4 g/dL  Hematocrit : 34.9 %  Platelet Count - Automated : 244 K/uL  Mean Cell Volume : 87.0 fl  Mean Cell Hemoglobin : 28.4 pg  Mean Cell Hemoglobin Concentration : 32.7 gm/dL  Auto Neutrophil # : x  Auto Lymphocyte # : x  Auto Monocyte # : x  Auto Eosinophil # : x  Auto Basophil # : x  Auto Neutrophil % : x  Auto Lymphocyte % : x  Auto Monocyte % : x  Auto Eosinophil % : x  Auto Basophil % : x    01-19    135  |  100  |  19  ----------------------------<  96  4.3   |  21<L>  |  0.63    Ca    9.4      19 Jan 2022 16:41    TPro  7.2  /  Alb  4.2  /  TBili  0.2  /  DBili  x   /  AST  23  /  ALT  12  /  AlkPhos  64  01-19    _____________________________________________  RADIOLOGY:

## 2022-02-07 ENCOUNTER — APPOINTMENT (OUTPATIENT)
Dept: TRAUMA SURGERY | Facility: CLINIC | Age: 77
End: 2022-02-07
Payer: MEDICARE

## 2022-02-07 VITALS
WEIGHT: 86 LBS | DIASTOLIC BLOOD PRESSURE: 70 MMHG | HEART RATE: 76 BPM | SYSTOLIC BLOOD PRESSURE: 127 MMHG | BODY MASS INDEX: 19.35 KG/M2 | HEIGHT: 56 IN | TEMPERATURE: 97.3 F
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
## 2022-02-07 DIAGNOSIS — I10 ESSENTIAL (PRIMARY) HYPERTENSION: ICD-10-CM

## 2022-02-07 DIAGNOSIS — F41.9 ANXIETY DISORDER, UNSPECIFIED: ICD-10-CM

## 2022-02-07 DIAGNOSIS — S70.10XA CONTUSION OF UNSPECIFIED THIGH, INITIAL ENCOUNTER: ICD-10-CM
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
## 2022-02-07 DIAGNOSIS — R63.6 UNDERWEIGHT: ICD-10-CM

## 2022-02-07 DIAGNOSIS — S70.12XD CONTUSION OF LEFT THIGH, SUBSEQUENT ENCOUNTER: ICD-10-CM

## 2022-02-07 DIAGNOSIS — F41.1 GENERALIZED ANXIETY DISORDER: ICD-10-CM

## 2022-02-07 PROCEDURE — 99212 OFFICE O/P EST SF 10 MIN: CPT

## 2022-02-07 RX ORDER — ZINC SULFATE TAB 220 MG (50 MG ZINC EQUIVALENT) 220 (50 ZN) MG
220 (50 ZN) TAB ORAL DAILY
Refills: 0 | Status: ACTIVE | COMMUNITY
Start: 2022-02-07

## 2022-02-07 RX ORDER — PANTOPRAZOLE SODIUM 40 MG/1
40 TABLET, DELAYED RELEASE ORAL DAILY
Refills: 0 | Status: ACTIVE | COMMUNITY
Start: 2022-02-07

## 2022-02-07 RX ORDER — GABAPENTIN 100 MG/1
100 CAPSULE ORAL 3 TIMES DAILY
Qty: 270 | Refills: 1 | Status: DISCONTINUED | COMMUNITY
Start: 2017-06-01 | End: 2022-02-07

## 2022-02-07 RX ORDER — ASCORBIC ACID 500 MG
500 TABLET ORAL DAILY
Refills: 0 | Status: ACTIVE | COMMUNITY
Start: 2022-02-07

## 2022-02-07 RX ORDER — ALPRAZOLAM 0.25 MG/1
0.25 TABLET ORAL TWICE DAILY
Refills: 0 | Status: ACTIVE | COMMUNITY

## 2022-02-07 RX ORDER — ACETAMINOPHEN 325 MG/1
325 TABLET, FILM COATED ORAL
Refills: 0 | Status: DISCONTINUED | COMMUNITY
Start: 2017-05-04 | End: 2022-02-07

## 2022-02-07 RX ORDER — AMLODIPINE BESYLATE 10 MG/1
10 TABLET ORAL DAILY
Refills: 0 | Status: ACTIVE | COMMUNITY

## 2022-02-07 RX ORDER — CARBIDOPA AND LEVODOPA 25; 100 MG/1; MG/1
25-100 TABLET ORAL 3 TIMES DAILY
Refills: 0 | Status: ACTIVE | COMMUNITY

## 2022-02-07 RX ORDER — ESCITALOPRAM OXALATE 5 MG/1
5 TABLET ORAL DAILY
Refills: 0 | Status: ACTIVE | COMMUNITY

## 2022-02-07 RX ORDER — DRONABINOL 2.5 MG/1
2.5 CAPSULE ORAL TWICE DAILY
Refills: 0 | Status: ACTIVE | COMMUNITY
Start: 2022-02-07

## 2022-02-07 RX ORDER — SENNOSIDES 8.6 MG/1
8.6 CAPSULE, GELATIN COATED ORAL AT BEDTIME
Refills: 0 | Status: ACTIVE | COMMUNITY
Start: 2022-02-07

## 2022-02-07 NOTE — HISTORY OF PRESENT ILLNESS
[FreeTextEntry1] : Ms. Calloway presents for follow up after fall with left hip hematoma. She is complaining of headaches several times a week which she is treating with Tylenol, centering on the back of her head where she struck her head during her fall on Jan 20. She does not usually have headaches. THese are not accompanied by dizziness or vision changes, no nausea. Her hip is feeling better and she is able to walk without discomfort. \par \par L hip: hematoma present, soft, mildly ttp. No wounds or skin necrosis.\par \par I reviewed Ms. Calloway's CT results with her and her daughter. They were aware of her residual aortic dissection, and her daughter stated that her CT surgeon said there is no intervention recommended due to her age and low weight. We also discussed the thyroid and pancreatic findings, and I recommended that she can follow up with her PMD for these as they may need surveillance. Also, she is on Norvasc, which we started in the hospital- we discussed that this is for BP control for her dissection, and I recommended that she see her PCP for further management recommendations. Questions answered.

## 2023-01-24 NOTE — ED ADULT NURSE NOTE - NS PRO PASSIVE SMOKE EXP
PAST MEDICAL HISTORY:  Adrenal insufficiency     Afib s/p ablation/Resolved    Anemia IV Iron    Anxiety     Aspiration pneumonia July '19- hospitalized and treated    Bowel obstruction     Bronchomalacia     Chronic Low Back Pain     Chronic obstructive pulmonary disease (COPD) Asthma on Symbicort, 2L O2,  last exacerbation 8/2022 wast at     Clostridium Difficile Infection 1999    Colonic inertia     COVID-19 vaccine series completed 3/2021    Duodenal ulcer hx of bleeding in past    Empyema     Encounter for insertion of venous access port Rt chest wall Mediport    Endometriosis     GERD (gastroesophageal reflux disease)     GI bleed s/p transfusion 9/12    H/O CHF     H/O sepsis urosepsis    H/O slipped capital femoral epiphysis (SCFE) age 8    History of ileus     HTN (hypertension)     Hx MRSA Infection treated now 9/23/22    Hypogammaglobulinemia treated with gamma globulin    Hypoglycemia     Hypokalemia     Hypomagnesemia     Hyponatremia     Hypothyroid on Synthroid    IBS (irritable bowel syndrome) h/o    Ileus 7/2021    Lymphedema both lower legs  used ready wraps    Manic Depression     Migraine     Narcolepsy     Neurogenic Bladder     OA (osteoarthritis)     Orthostatic hypotension h/o    PAC (premature atrial contraction)     PCOS (polycystic ovarian syndrome)     Peripheral Neuropathy     Pneumonia hospitalized 5/ 2022    Post traumatic stress disorder (PTSD)     Postgastric surgery syndrome     Pulmonary nodule     Recurrent urinary tract infection     Regular sinus tachycardia     Renal Abscess     Salmonella infection history of    Schizoaffective disorder, unspecified type     Septic embolism 4/08    Seroma abdominal wall and buttock    Severe malnutrition 12/2020 - 01/2021    Sigmoid Volvulus 1985    Sleep apnea use trilogy    Spinal stenosis s/p epidural injection 4/12    Spinal stenosis, lumbar     Spondylolisthesis, lumbar region     Suprapubic catheter 2/2 neurogenic bladder    Tardive dyskinesia     Torn rotator cuff right    Tracheal/bronchial disease Tracheobronchial malacia. Hospitalized 5/ 2022, use trilogy device     Unknown

## 2023-07-08 NOTE — PROGRESS NOTE ADULT - PROBLEM SELECTOR PROBLEM 2
Metabolic encephalopathy
aerobic capacity/endurance/gait, locomotion, and balance
